# Patient Record
Sex: FEMALE | Race: WHITE | NOT HISPANIC OR LATINO
[De-identification: names, ages, dates, MRNs, and addresses within clinical notes are randomized per-mention and may not be internally consistent; named-entity substitution may affect disease eponyms.]

---

## 2021-05-01 ENCOUNTER — HEALTH MAINTENANCE LETTER (OUTPATIENT)
Age: 21
End: 2021-05-01

## 2021-05-04 ASSESSMENT — ENCOUNTER SYMPTOMS
RECTAL PAIN: 1
PANIC: 0
DEPRESSION: 0
INSOMNIA: 1
BOWEL INCONTINENCE: 0
BLOATING: 1
FLANK PAIN: 0
NAUSEA: 1
DIFFICULTY URINATING: 0
NERVOUS/ANXIOUS: 1
DIARRHEA: 0
BLOOD IN STOOL: 0
JAUNDICE: 0
HOT FLASHES: 0
CONSTIPATION: 1
DECREASED CONCENTRATION: 1
DECREASED LIBIDO: 0
HEMATURIA: 0
ABDOMINAL PAIN: 1
VOMITING: 0
HEARTBURN: 0
DYSURIA: 1

## 2021-05-04 ASSESSMENT — ANXIETY QUESTIONNAIRES
7. FEELING AFRAID AS IF SOMETHING AWFUL MIGHT HAPPEN: NOT AT ALL
2. NOT BEING ABLE TO STOP OR CONTROL WORRYING: SEVERAL DAYS
3. WORRYING TOO MUCH ABOUT DIFFERENT THINGS: SEVERAL DAYS
4. TROUBLE RELAXING: SEVERAL DAYS
6. BECOMING EASILY ANNOYED OR IRRITABLE: SEVERAL DAYS
GAD7 TOTAL SCORE: 5
1. FEELING NERVOUS, ANXIOUS, OR ON EDGE: SEVERAL DAYS
5. BEING SO RESTLESS THAT IT IS HARD TO SIT STILL: NOT AT ALL
7. FEELING AFRAID AS IF SOMETHING AWFUL MIGHT HAPPEN: NOT AT ALL
GAD7 TOTAL SCORE: 5

## 2021-05-05 ASSESSMENT — ANXIETY QUESTIONNAIRES: GAD7 TOTAL SCORE: 5

## 2021-05-07 ENCOUNTER — OFFICE VISIT (OUTPATIENT)
Dept: OBGYN | Facility: CLINIC | Age: 21
End: 2021-05-07
Payer: COMMERCIAL

## 2021-05-07 VITALS
DIASTOLIC BLOOD PRESSURE: 83 MMHG | BODY MASS INDEX: 19.25 KG/M2 | SYSTOLIC BLOOD PRESSURE: 129 MMHG | HEART RATE: 128 BPM | HEIGHT: 66 IN | WEIGHT: 119.8 LBS

## 2021-05-07 DIAGNOSIS — Z12.4 SCREENING FOR MALIGNANT NEOPLASM OF CERVIX: ICD-10-CM

## 2021-05-07 DIAGNOSIS — Z00.00 VISIT FOR PREVENTIVE HEALTH EXAMINATION: Primary | ICD-10-CM

## 2021-05-07 DIAGNOSIS — Z01.411 ENCOUNTER FOR GYNECOLOGICAL EXAMINATION WITH ABNORMAL FINDING: ICD-10-CM

## 2021-05-07 DIAGNOSIS — B37.31 YEAST VAGINITIS: ICD-10-CM

## 2021-05-07 DIAGNOSIS — Z30.011 ORAL CONTRACEPTION INITIAL PRESCRIPTION: ICD-10-CM

## 2021-05-07 LAB
CLUE CELLS: NEGATIVE
TRICHOMONAS (WET PREP): NEGATIVE
YEAST (WET PREP): POSITIVE

## 2021-05-07 PROCEDURE — G0145 SCR C/V CYTO,THINLAYER,RESCR: HCPCS | Performed by: OBSTETRICS & GYNECOLOGY

## 2021-05-07 PROCEDURE — 99385 PREV VISIT NEW AGE 18-39: CPT | Performed by: OBSTETRICS & GYNECOLOGY

## 2021-05-07 PROCEDURE — 99212 OFFICE O/P EST SF 10 MIN: CPT | Mod: 25 | Performed by: OBSTETRICS & GYNECOLOGY

## 2021-05-07 PROCEDURE — G0463 HOSPITAL OUTPT CLINIC VISIT: HCPCS

## 2021-05-07 PROCEDURE — 87210 SMEAR WET MOUNT SALINE/INK: CPT | Performed by: OBSTETRICS & GYNECOLOGY

## 2021-05-07 RX ORDER — FLUCONAZOLE 150 MG/1
150 TABLET ORAL DAILY
Qty: 3 TABLET | Refills: 0 | Status: SHIPPED | OUTPATIENT
Start: 2021-05-07 | End: 2021-05-10

## 2021-05-07 RX ORDER — LEVONORGESTREL/ETHIN.ESTRADIOL 0.1-0.02MG
1 TABLET ORAL DAILY
Qty: 84 TABLET | Refills: 4 | Status: SHIPPED | OUTPATIENT
Start: 2021-05-07 | End: 2022-06-07

## 2021-05-07 ASSESSMENT — ANXIETY QUESTIONNAIRES
2. NOT BEING ABLE TO STOP OR CONTROL WORRYING: SEVERAL DAYS
GAD7 TOTAL SCORE: 5
3. WORRYING TOO MUCH ABOUT DIFFERENT THINGS: SEVERAL DAYS
5. BEING SO RESTLESS THAT IT IS HARD TO SIT STILL: NOT AT ALL
1. FEELING NERVOUS, ANXIOUS, OR ON EDGE: SEVERAL DAYS
6. BECOMING EASILY ANNOYED OR IRRITABLE: SEVERAL DAYS
7. FEELING AFRAID AS IF SOMETHING AWFUL MIGHT HAPPEN: NOT AT ALL

## 2021-05-07 ASSESSMENT — PATIENT HEALTH QUESTIONNAIRE - PHQ9: 5. POOR APPETITE OR OVEREATING: SEVERAL DAYS

## 2021-05-07 ASSESSMENT — MIFFLIN-ST. JEOR: SCORE: 1315.16

## 2021-05-07 NOTE — PROGRESS NOTES
"SUBJECTIVE   Valerie Rachel is a 21 year old G0, No LMP recorded., here for an annual preventive exam.    Specific concerns today:  Establish care (previously lived in [facility])    1. Chronic bladder pain x 1-2 years, no food triggers, tried pelvic PT (helpful, not constant), worse with really stress and dehydration, more manageable now  2. \"somethings going on\", very uncomfortable x 2 days, kierra yestesterday.  Has had chronic vulvovaginitis 5-6 years, since college (yeast), doesn't test as yeast recently, more common right before period or right after, sx = itching/thick discharge, general discomfort, mostly external, after sex sometimes?  Has previously been on weekly long term diflucan (helped for a while, then not so much), vaginal E2 not helpful.  Did boric acid?    Gynecologic History  LMP 2 weeks ago  Menstrual History:  Menstrual History 5/10/2021   LAST MENSTRUAL PERIOD 2021     Current contraception: oral contraceptives, With new pack, HA and nausea, went on for skin and heavy periods  Desires pregnancy within 12 months: N  Denies history of STI  No results found for: PAP   History of abnormal Pap smear:  No, last in 2019  HPV vaccine reports received 3/3    Obstetric History  OB History    Para Term  AB Living   0 0 0 0 0 0   SAB TAB Ectopic Multiple Live Births   0 0 0 0 0        Health Maintenance    There is no immunization history on file for this patient.  Health Maintenance   Topic Date Due     PREVENTIVE CARE VISIT  Never done     CHLAMYDIA SCREENING  Never done     ADVANCE CARE PLANNING  Never done     DTAP/TDAP/TD IMMUNIZATION (1 - Tdap) Never done     HPV IMMUNIZATION (1 - 2-dose series) Never done     HIV SCREENING  Never done     HEPATITIS C SCREENING  Never done     PAP  Never done     COVID-19 Vaccine (2 - Pfizer 2-dose series) 2021     INFLUENZA VACCINE (Season Ended) 2021     PHQ-2  Completed     IPV IMMUNIZATION  Completed     Pneumococcal Vaccine: " Pediatrics (0 to 5 Years) and At-Risk Patients (6 to 64 Years)  Aged Out     MENINGITIS IMMUNIZATION  Aged Out     HEPATITIS B IMMUNIZATION  Aged Out     No results found for: CHOL  No results found for: HDL  No results found for: LDL  No results found for: TSH    Past Medical History  Past Medical History:   Diagnosis Date     Anxiety      Patellar tendonitis      Recurrent yeast vulvovaginitis        Past Surgical History  Past Surgical History:   Procedure Laterality Date     NO HISTORY OF SURGERY         Medications  Current Outpatient Medications   Medication     cholecalciferol 50 MCG (2000 UT) tablet     Norgestimate-Eth Estradiol (ESTARYLLA PO)     No current facility-administered medications for this visit.        Allergies  Allergies   Allergen Reactions     Penicillins Hives and Rash       Social History  Social History     Socioeconomic History     Marital status: Single     Spouse name: Not on file     Number of children: Not on file     Years of education: Not on file     Highest education level: Not on file   Occupational History     Not on file   Social Needs     Financial resource strain: Not on file     Food insecurity     Worry: Not on file     Inability: Not on file     Transportation needs     Medical: Not on file     Non-medical: Not on file   Tobacco Use     Smoking status: Never Smoker     Smokeless tobacco: Never Used   Substance and Sexual Activity     Alcohol use: Yes     Comment: I drink socially on occasion.     Drug use: Never     Sexual activity: Yes     Partners: Male     Birth control/protection: Pull-out method, Condom, Pill   Lifestyle     Physical activity     Days per week: Not on file     Minutes per session: Not on file     Stress: Not on file   Relationships     Social connections     Talks on phone: Not on file     Gets together: Not on file     Attends Buddhism service: Not on file     Active member of club or organization: Not on file     Attends meetings of clubs or  "organizations: Not on file     Relationship status: Not on file     Intimate partner violence     Fear of current or ex partner: Not on file     Emotionally abused: Not on file     Physically abused: Not on file     Forced sexual activity: Not on file   Other Topics Concern     Not on file   Social History Narrative    Work:  for [employer]    ANGELA Gonzalez grad: Advertising    Grew up in [place]    Partner lives in [place]       Family History  No family history of breast, uterine, ovarian or colon cancer.    Review of Systems  CONSTITUTIONAL: NEGATIVE for fever, chills  EYES: NEGATIVE for vision changes   RESP: NEGATIVE for significant cough or SOB  CV: NEGATIVE for chest pain, palpitations   GI: NEGATIVE for nausea, abdominal pain, heartburn, or change in bowel habits  : NEGATIVE for frequency, dysuria, or hematuria  MUSCULOSKELETAL: NEGATIVE for significant arthralgias or myalgia  NEURO: NEGATIVE for weakness, dizziness or paresthesias or headache    OBJECTIVE   /83 (BP Location: Right arm, Patient Position: Chair)   Pulse 128   Ht 1.676 m (5' 6\")   Wt 54.3 kg (119 lb 12.8 oz)   BMI 19.34 kg/m      General:  Alert, no distress   HEENT:  Normocephalic, without obvious abnormality  No thyromegaly   Breasts: Within normal limits bilaterally, no LAD  No masses/lumps/tenderness or nipple discharge   Lungs:  Clear to auscultation bilaterally   Heart:  Regular rate and rhythm, no murmur   Abdomen:  Soft, non-tender, non-distended, bowel sounds normal   Pelvic: -normal external genitalia, no visible lesions  -normal urethral meatus  -vagina normal with moderately large amount white, viscous discharge throughout, including vestibule  -cervix normal in appearance, no masses  -uterus small, AV, mobile, NT  -no adnexal masses, NT  -normal appearing anus, perineum   Extremities:  normal     Wet prep + hyphae    LEXUS-7 SCORE 5/4/2021 5/7/2021 5/10/2021   Total Score 5 (mild anxiety) - - "   Total Score 5 5 5     PHQ 5/10/2021   PHQ-9 Total Score 4   Q9: Thoughts of better off dead/self-harm past 2 weeks Not at all     ASSESSMENT   Valerie Rachel is a 21 year old G0, annual preventive exam within normal limits.    PLAN     Age 19-39 Annual Preventive Exam  1.  Screening   STI testing declined   Cervical cancer pap today   CBE wnl    2.  Immunizations: reports getting vaccinations as a child    3.  Patient Education   a.  Additional teaching done at this visit included: birth control (Rx lower EE pill given sx at start of new pack)   b.  Discussed with patient risks/ benefits and treatment options of prescribed medications or other treatment modalities.   c.  Recommended folate 0.4 - 0.8 mg daily for women of reproductive age    4. Yeast vaginitis   Rx diflucan daily x 3 days given history   RTC for yeast culture, possible alternative therapy    5. Mental health: LEXUS improved from previous (outside), feels many time situational, offered [facility] psychology    RTC one week if no sx improvement, o/w annually/prn    Abi Alicea MD MPH

## 2021-05-07 NOTE — PATIENT INSTRUCTIONS

## 2021-05-10 ENCOUNTER — TELEPHONE (OUTPATIENT)
Dept: OBGYN | Facility: CLINIC | Age: 21
End: 2021-05-10

## 2021-05-10 ENCOUNTER — OFFICE VISIT (OUTPATIENT)
Dept: OBGYN | Facility: CLINIC | Age: 21
End: 2021-05-10
Attending: ADVANCED PRACTICE MIDWIFE
Payer: COMMERCIAL

## 2021-05-10 VITALS
WEIGHT: 121.5 LBS | SYSTOLIC BLOOD PRESSURE: 120 MMHG | HEART RATE: 93 BPM | DIASTOLIC BLOOD PRESSURE: 86 MMHG | BODY MASS INDEX: 19.53 KG/M2 | HEIGHT: 66 IN

## 2021-05-10 DIAGNOSIS — B37.31 YEAST VAGINITIS: Primary | ICD-10-CM

## 2021-05-10 PROBLEM — R39.82 CHRONIC BLADDER PAIN: Status: ACTIVE | Noted: 2019-09-12

## 2021-05-10 PROBLEM — M76.50 PATELLAR TENDONITIS: Status: ACTIVE | Noted: 2021-05-10

## 2021-05-10 PROCEDURE — 87102 FUNGUS ISOLATION CULTURE: CPT | Performed by: ADVANCED PRACTICE MIDWIFE

## 2021-05-10 PROCEDURE — G0463 HOSPITAL OUTPT CLINIC VISIT: HCPCS

## 2021-05-10 PROCEDURE — 87106 FUNGI IDENTIFICATION YEAST: CPT | Performed by: ADVANCED PRACTICE MIDWIFE

## 2021-05-10 PROCEDURE — 99213 OFFICE O/P EST LOW 20 MIN: CPT | Performed by: ADVANCED PRACTICE MIDWIFE

## 2021-05-10 ASSESSMENT — PATIENT HEALTH QUESTIONNAIRE - PHQ9
SUM OF ALL RESPONSES TO PHQ QUESTIONS 1-9: 4
5. POOR APPETITE OR OVEREATING: SEVERAL DAYS

## 2021-05-10 ASSESSMENT — ANXIETY QUESTIONNAIRES
5. BEING SO RESTLESS THAT IT IS HARD TO SIT STILL: NOT AT ALL
6. BECOMING EASILY ANNOYED OR IRRITABLE: SEVERAL DAYS
1. FEELING NERVOUS, ANXIOUS, OR ON EDGE: SEVERAL DAYS
2. NOT BEING ABLE TO STOP OR CONTROL WORRYING: SEVERAL DAYS
7. FEELING AFRAID AS IF SOMETHING AWFUL MIGHT HAPPEN: NOT AT ALL
GAD7 TOTAL SCORE: 5
3. WORRYING TOO MUCH ABOUT DIFFERENT THINGS: SEVERAL DAYS

## 2021-05-10 ASSESSMENT — MIFFLIN-ST. JEOR: SCORE: 1322.87

## 2021-05-10 NOTE — PROGRESS NOTES
"Virginie Hampton is a 21 year old who presents for the following health issues:    HPI   Has a history of multiple yeast infections/year  Was seen Friday by Dr. Alicea and given 3 days of Diflucan 150mg daily. Took last dose yesterday and has not noticed any resolution of symptoms; Thick, white, clumpy discharge, itching.  Does state that labia are less edematous, some slight improvement of symptoms.  Sexually active, not this weekend    Review of Systems   Endorses vaginal itching, discharge, general discomfort.  Denies nausea, vomiting, fever, chills, vaginal bleeding       Objective    /86 (BP Location: Left arm, Patient Position: Chair)   Pulse 93   Ht 1.676 m (5' 6\")   Wt 55.1 kg (121 lb 8 oz)   LMP 04/21/2021 (Approximate)   Breastfeeding No   BMI 19.61 kg/m    Body mass index is 19.61 kg/m .  Physical Exam   General: Alert and oriented, in no acute distress.  Skin: Warm and dry, no abnormalities noted.  Eyes: Extra-ocular muscles intact, pupils equal and reactive.  ENT: Speech intact, nasal passages open, no hearing impairment noted.  CV: No cyanosis or pallor, warm and well perfused.  Respiratory: No respiratory distress, no accessory muscle use.  Neuro: Gait and station normal, comprehension intact. Gross and fine motor skills intact.   Psychiatric: Mood and affect appear normal.   Extremities: Warm, able to move all four extremities at will.   (female):  female external genitalia red and irritated, normal urethral meatus and vaginal discharge - moderate, white, thick and odorless    Office Visit on 05/07/2021   Component Date Value Ref Range Status     Trichomonas Wet Prep 05/07/2021 Negative  Negative Final     Clue cells 05/07/2021 Negative  Negative Final     Yeast Wet Prep 05/07/2021 Positive  Negative Final         Assessment & Plan     Yeast vaginitis  Patient has a history of chronic yeast infections, with multiple per year. Last positive wet prep 5/7/21. Reports that typically " resolved with Diflucan but is still having itching, thick discharge. Recommended use of Vagisil for itching.   Yeast culture obtained today and recommend that she follow up with OB at the end of the week. No additional Fluconazole prescription indicated at this time.  - Yeast Culture    I, Elif Nova DO, am serving as a scribe; to document services personally performed by Emily Matias based on data collection and the provider's statements to me.     I agree with the PFSH and ROS as completed by Elif Nova DO  except for changes made by me. The remainder of the encounter was performed by me and scribed by Elif Nova DO. The scribed note accurately reflects my personal services and decisions made by me.   BEN Theodore CNM

## 2021-05-10 NOTE — TELEPHONE ENCOUNTER
----- Message from Luisana Yanes RN sent at 5/10/2021 11:14 AM CDT -----  Regarding: Still having yeast infection symptoms after Diflucan

## 2021-05-10 NOTE — LETTER
"5/10/2021       RE: Valerie Rachel  [address] [address]     Dear Colleague,    Thank you for referring your patient, Valerie Rachel, to the [facility] [place] at [facility]. Please see a copy of my visit note below.    Virginie Hampton is a 21 year old who presents for the following health issues:    HPI   Has a history of multiple yeast infections/year  Was seen Friday by Dr. Alicea and given 3 days of Diflucan 150mg daily. Took last dose yesterday and has not noticed any resolution of symptoms; Thick, white, clumpy discharge, itching.  Does state that labia are less edematous, some slight improvement of symptoms.  Sexually active, not this weekend    Review of Systems   Endorses vaginal itching, discharge, general discomfort.  Denies nausea, vomiting, fever, chills, vaginal bleeding       Objective    /86 (BP Location: Left arm, Patient Position: Chair)   Pulse 93   Ht 1.676 m (5' 6\")   Wt 55.1 kg (121 lb 8 oz)   LMP 04/21/2021 (Approximate)   Breastfeeding No   BMI 19.61 kg/m    Body mass index is 19.61 kg/m .  Physical Exam   General: Alert and oriented, in no acute distress.  Skin: Warm and dry, no abnormalities noted.  Eyes: Extra-ocular muscles intact, pupils equal and reactive.  ENT: Speech intact, nasal passages open, no hearing impairment noted.  CV: No cyanosis or pallor, warm and well perfused.  Respiratory: No respiratory distress, no accessory muscle use.  Neuro: Gait and station normal, comprehension intact. Gross and fine motor skills intact.   Psychiatric: Mood and affect appear normal.   Extremities: Warm, able to move all four extremities at will.   (female):  female external genitalia red and irritated, normal urethral meatus and vaginal discharge - moderate, white, thick and odorless    Office Visit on 05/07/2021   Component Date Value Ref Range Status     Trichomonas Wet Prep 05/07/2021 " Negative  Negative Final     Clue cells 05/07/2021 Negative  Negative Final     Yeast Wet Prep 05/07/2021 Positive  Negative Final         Assessment & Plan     Yeast vaginitis  Patient has a history of chronic yeast infections, with multiple per year. Last positive wet prep 5/7/21. Reports that typically resolved with Diflucan but is still having itching, thick discharge. Recommended use of Vagisil for itching.   Yeast culture obtained today and recommend that she follow up with OB at the end of the week. No additional Fluconazole prescription indicated at this time.  - Yeast Culture    I, Elif Nova DO, am serving as a scribe; to document services personally performed by Emily Matias based on data collection and the provider's statements to me.     I agree with the PFSH and ROS as completed by Elif Nova DO  except for changes made by me. The remainder of the encounter was performed by me and scribed by Elif Nova DO. The scribed note accurately reflects my personal services and decisions made by me.   BEN Theodore CNM          Again, thank you for allowing me to participate in the care of your patient.      Sincerely,    BEN Theodore CNM

## 2021-05-10 NOTE — TELEPHONE ENCOUNTER
Patient called RN with continued symptoms after taking diflucan. Spoke with , wants patient to come in for a yeast culture. Patient agreeable to plan and available, appointment made today at 2:20pm. Nicole Gee RN on 5/10/2021 at 1:47 PM

## 2021-05-10 NOTE — LETTER
Date:May 20, 2021      Patient was self referred, no letter generated. Do not send.        [facility] Health Information

## 2021-05-10 NOTE — NURSING NOTE
Chief Complaint   Patient presents with     Gyn Exam     Treated with Diflucan for yeast last week and still having symptoms. Needs culture.        See AL James 5/10/2021

## 2021-05-11 LAB
COPATH REPORT: NORMAL
PAP: NORMAL

## 2021-05-11 ASSESSMENT — ANXIETY QUESTIONNAIRES: GAD7 TOTAL SCORE: 5

## 2021-05-13 LAB
Lab: ABNORMAL
SPECIMEN SOURCE: ABNORMAL
YEAST SPEC QL CULT: ABNORMAL

## 2021-05-14 ENCOUNTER — OFFICE VISIT (OUTPATIENT)
Dept: OBGYN | Facility: CLINIC | Age: 21
End: 2021-05-14
Attending: OBSTETRICS & GYNECOLOGY
Payer: COMMERCIAL

## 2021-05-14 VITALS — HEIGHT: 66 IN | WEIGHT: 121.4 LBS | BODY MASS INDEX: 19.51 KG/M2

## 2021-05-14 DIAGNOSIS — B37.31 YEAST VAGINITIS: Primary | ICD-10-CM

## 2021-05-14 PROCEDURE — G0463 HOSPITAL OUTPT CLINIC VISIT: HCPCS

## 2021-05-14 PROCEDURE — 99213 OFFICE O/P EST LOW 20 MIN: CPT | Performed by: OBSTETRICS & GYNECOLOGY

## 2021-05-14 RX ORDER — TERCONAZOLE 0.4 %
1 CREAM WITH APPLICATOR VAGINAL AT BEDTIME
Qty: 1 G | Refills: 0 | Status: SHIPPED | OUTPATIENT
Start: 2021-05-14 | End: 2021-05-28

## 2021-05-14 ASSESSMENT — MIFFLIN-ST. JEOR: SCORE: 1322.42

## 2021-05-14 NOTE — LETTER
Date:May 20, 2021      Patient was self referred, no letter generated. Do not send.        [facility] Health Information

## 2021-05-14 NOTE — PROGRESS NOTES
"SUBJECTIVE   Valerie Rachel is a 21 year old G0, Patient's last menstrual period was 04/21/2021 (approximate)., here for yeast vulvovaginitis follow up.    Took diflucan daily x 3 days starting one week ago, labial/introital swelling improved, itching improved although not gone and thus was seen 4 days ago for culture - candida albicans.  Today, notes feeling much better, slight itch.  Worried maybe isn't \"gone\".  Due for withdrawal bleeding in 2-3 days, usually improves after that she notes today.    Previously has used boric acid prn for sx (none for this episode).    Past Medical History  Past Medical History:   Diagnosis Date     Anxiety      Patellar tendonitis      Recurrent yeast vulvovaginitis        Medications  Current Outpatient Medications   Medication     terconazole (TERAZOL 7) 0.4 % vaginal cream     cholecalciferol 50 MCG (2000 UT) tablet     levonorgestrel-ethinyl estradiol (AVIANE) 0.1-20 MG-MCG tablet     Norgestimate-Eth Estradiol (ESTARYLLA PO)     No current facility-administered medications for this visit.        Allergies  Allergies   Allergen Reactions     Penicillins Hives and Rash       Review of Systems   ROS: 10 point ROS neg other than the symptoms noted above in the HPI.    OBJECTIVE   Ht 1.676 m (5' 6\")   Wt 55.1 kg (121 lb 6.4 oz)   LMP 04/21/2021 (Approximate)   BMI 19.59 kg/m    General:  Alert, no distress   Head:  Normocephalic, without obvious abnormality   Pelvic: deferred   Extremities:  normal     ASSESSMENT   Valerie Rachel is a 21 year old G0, candida vaginitis.    PLAN     -- given history, recommend vaginal azole therapy x 14 weeks to ensure resolution of infection  -- f/u 2 weeks     20 minutes spent on the date of the encounter doing chart review (previous clinic visits, hospital records, lab results, imaging studies, and procedural documentation) and the patient's history and exam, documentation and further activities as noted above.    Abi Alicea MD MPH    "
none

## 2021-05-14 NOTE — LETTER
"5/14/2021       RE: Valerie Rachel  [address] [address]     Dear Colleague,    Thank you for referring your patient, Valerie Rachel, to the [facility] [place] at [facility]. Please see a copy of my visit note below.    SUBJECTIVE   Valerie Rachel is a 21 year old G0, Patient's last menstrual period was 04/21/2021 (approximate)., here for yeast vulvovaginitis follow up.    Took diflucan daily x 3 days starting one week ago, labial/introital swelling improved, itching improved although not gone and thus was seen 4 days ago for culture - candida albicans.  Today, notes feeling much better, slight itch.  Worried maybe isn't \"gone\".  Due for withdrawal bleeding in 2-3 days, usually improves after that she notes today.    Previously has used boric acid prn for sx (none for this episode).    Past Medical History  Past Medical History:   Diagnosis Date     Anxiety      Patellar tendonitis      Recurrent yeast vulvovaginitis        Medications  Current Outpatient Medications   Medication     terconazole (TERAZOL 7) 0.4 % vaginal cream     cholecalciferol 50 MCG (2000 UT) tablet     levonorgestrel-ethinyl estradiol (AVIANE) 0.1-20 MG-MCG tablet     Norgestimate-Eth Estradiol (ESTARYLLA PO)     No current facility-administered medications for this visit.        Allergies  Allergies   Allergen Reactions     Penicillins Hives and Rash       Review of Systems   ROS: 10 point ROS neg other than the symptoms noted above in the HPI.    OBJECTIVE   Ht 1.676 m (5' 6\")   Wt 55.1 kg (121 lb 6.4 oz)   LMP 04/21/2021 (Approximate)   BMI 19.59 kg/m    General:  Alert, no distress   Head:  Normocephalic, without obvious abnormality   Pelvic: deferred   Extremities:  normal     ASSESSMENT   Valerie Rachel is a 21 year old G0, candida vaginitis.    PLAN     -- given history, recommend vaginal azole therapy x 14 weeks to ensure resolution of " infection  -- f/u 2 weeks     20 minutes spent on the date of the encounter doing chart review (previous clinic visits, hospital records, lab results, imaging studies, and procedural documentation) and the patient's history and exam, documentation and further activities as noted above.    Abi Alicea MD MPH        Again, thank you for allowing me to participate in the care of your patient.      Sincerely,    Dez Alicea MD

## 2021-05-20 ENCOUNTER — MYC MEDICAL ADVICE (OUTPATIENT)
Dept: OBGYN | Facility: CLINIC | Age: 21
End: 2021-05-20

## 2021-05-20 DIAGNOSIS — B37.31 YEAST VAGINITIS: ICD-10-CM

## 2021-05-21 NOTE — TELEPHONE ENCOUNTER
MyChart message received from patient regarding terconazole rx.    Note states she is to take for 14 days, ordered terconazole 7 for 14 days. Called pharmacy to confirm whether or not she will have enough for a 14 day dose. Pharmacist states she would only have enough supply for 7-10 days. Verbal order for refill given, pharmacist confirmed.     MyChart message sent to patient informing her that a refill was sent.          The sheath is inserted into the right femoral vein.

## 2021-05-28 ENCOUNTER — ALLIED HEALTH/NURSE VISIT (OUTPATIENT)
Dept: OBGYN | Facility: CLINIC | Age: 21
End: 2021-05-28
Attending: OBSTETRICS & GYNECOLOGY
Payer: COMMERCIAL

## 2021-05-28 VITALS
HEART RATE: 80 BPM | HEIGHT: 66 IN | BODY MASS INDEX: 20.15 KG/M2 | DIASTOLIC BLOOD PRESSURE: 78 MMHG | WEIGHT: 125.4 LBS | SYSTOLIC BLOOD PRESSURE: 117 MMHG

## 2021-05-28 DIAGNOSIS — B37.31 YEAST VAGINITIS: Primary | ICD-10-CM

## 2021-05-28 PROCEDURE — G0463 HOSPITAL OUTPT CLINIC VISIT: HCPCS

## 2021-05-28 PROCEDURE — 99213 OFFICE O/P EST LOW 20 MIN: CPT | Performed by: OBSTETRICS & GYNECOLOGY

## 2021-05-28 ASSESSMENT — MIFFLIN-ST. JEOR: SCORE: 1340.56

## 2021-05-28 ASSESSMENT — PAIN SCALES - GENERAL: PAINLEVEL: NO PAIN (0)

## 2021-05-28 NOTE — PROGRESS NOTES
"SUBJECTIVE   Valerie Rachel is a 21 year old G0, Patient's last menstrual period was 03/17/2021., here for yeast f/u    Sx free from yeast, one day left of intravaginal therapy  Wondering about future therapy if recurrs    Bloaty, rocha x 6d during what what would have been placebo week, feels fine now.  Wondering if related to new pill or if related to continuous use.  No bleeding    Past Medical History  Past Medical History:   Diagnosis Date     Anxiety      Patellar tendonitis      Recurrent yeast vulvovaginitis        Medications  Current Outpatient Medications   Medication     cholecalciferol 50 MCG (2000 UT) tablet     terconazole (TERAZOL 7) 0.4 % vaginal cream     levonorgestrel-ethinyl estradiol (AVIANE) 0.1-20 MG-MCG tablet     No current facility-administered medications for this visit.      Allergies  Allergies   Allergen Reactions     Penicillins Hives and Rash     Review of Systems   ROS: 10 point ROS neg other than the symptoms noted above in the HPI.    OBJECTIVE   /78   Pulse 80   Ht 1.676 m (5' 6\")   Wt 56.9 kg (125 lb 6.4 oz)   LMP 03/17/2021   BMI 20.24 kg/m    General:  Alert, no distress   Head:  Normocephalic, without obvious abnormality   Extremities:  normal       ASSESSMENT   Valerie Rachel is a 21 year old G0, yeast follow up.    PLAN     -- Yeast vaginitis: plan see with sx, if another wet prep or culture+ will consider preventive therapy  -- Reviewed [facility] mgmt, plan another pack of continuous use for now    15 minutes spent on the date of the encounter doing chart review (previous clinic visits, hospital records, lab results, imaging studies, and procedural documentation) and the patient's history and exam, documentation and further activities as noted above.    Abi Alicea MD MPH      "

## 2021-07-28 ENCOUNTER — MYC MEDICAL ADVICE (OUTPATIENT)
Dept: OBGYN | Facility: CLINIC | Age: 21
End: 2021-07-28

## 2021-07-28 NOTE — TELEPHONE ENCOUNTER
MyChart message received from patient reporting symptoms of yeast infection. Pt has history of difficult to treat vaginal yeast infections. Last infection 05/2021, 14 day terconazole treatment completed.     Called patient and left VM to call back to schedule appointment. Appointment held 7/29 at 2:30. Advised nothing in vagina and to stop boric acid 24 hours prior to appointment for accurate yeast culture. Instructed patient to call back to confirm availability for appointment or to reply to [employer] message which was sent as well.

## 2021-07-29 ENCOUNTER — OFFICE VISIT (OUTPATIENT)
Dept: OBGYN | Facility: CLINIC | Age: 21
End: 2021-07-29
Attending: OBSTETRICS & GYNECOLOGY
Payer: COMMERCIAL

## 2021-07-29 VITALS
SYSTOLIC BLOOD PRESSURE: 126 MMHG | BODY MASS INDEX: 20.59 KG/M2 | HEIGHT: 66 IN | WEIGHT: 128.1 LBS | HEART RATE: 57 BPM | DIASTOLIC BLOOD PRESSURE: 82 MMHG

## 2021-07-29 DIAGNOSIS — B37.31 RECURRENT CANDIDIASIS OF VAGINA: Primary | ICD-10-CM

## 2021-07-29 LAB
CLUE CELLS: NEGATIVE
TRICHOMONAS (WET PREP): NEGATIVE
WBC (WET PREP): NEGATIVE
YEAST (WET PREP): POSITIVE

## 2021-07-29 PROCEDURE — G0463 HOSPITAL OUTPT CLINIC VISIT: HCPCS

## 2021-07-29 PROCEDURE — 99213 OFFICE O/P EST LOW 20 MIN: CPT | Mod: GE | Performed by: OBSTETRICS & GYNECOLOGY

## 2021-07-29 PROCEDURE — 87210 SMEAR WET MOUNT SALINE/INK: CPT | Performed by: STUDENT IN AN ORGANIZED HEALTH CARE EDUCATION/TRAINING PROGRAM

## 2021-07-29 RX ORDER — FLUCONAZOLE 200 MG/1
200 TABLET ORAL WEEKLY
Qty: 28 TABLET | Refills: 0 | Status: SHIPPED | OUTPATIENT
Start: 2021-07-29 | End: 2022-06-07

## 2021-07-29 ASSESSMENT — ANXIETY QUESTIONNAIRES
5. BEING SO RESTLESS THAT IT IS HARD TO SIT STILL: NOT AT ALL
6. BECOMING EASILY ANNOYED OR IRRITABLE: SEVERAL DAYS
2. NOT BEING ABLE TO STOP OR CONTROL WORRYING: SEVERAL DAYS
7. FEELING AFRAID AS IF SOMETHING AWFUL MIGHT HAPPEN: NOT AT ALL
GAD7 TOTAL SCORE: 4
1. FEELING NERVOUS, ANXIOUS, OR ON EDGE: SEVERAL DAYS
3. WORRYING TOO MUCH ABOUT DIFFERENT THINGS: SEVERAL DAYS

## 2021-07-29 ASSESSMENT — MIFFLIN-ST. JEOR: SCORE: 1352.81

## 2021-07-29 ASSESSMENT — PATIENT HEALTH QUESTIONNAIRE - PHQ9: 5. POOR APPETITE OR OVEREATING: NOT AT ALL

## 2021-07-29 NOTE — PROGRESS NOTES
"[facility]  2021    Reason For Visit: yeast infection    S: Valerie Rachel is a 21 year old  with a history of recurrent yeast infection here for concern for yeast infection.     She reports taht about 5-6 years ago she had recurrent yeast infectins and was on long term diflucan, which helped for a while. She was seen on  for a yeast infection, and was given diflucan daily x3 days, followed by terconazole x14 days. She has been doing well since then. Now over the past few days has had increased white discharge and itchiness, consistent with her yeast infections. Denies urinary symptoms. Denies immunosuppression, diabetes, or any other medical conditions. Denies fevers, chills, headaches, chest pain, SOB, abdominal pain, n/v, constipation, diarrhea, difficulties with urination.     OBGYN Hx  Age at menarche:   Length of menstrual cycle: monthly  Heavy bleeding: no  Pain with menses: no  Pap Smears: 2021: NIL  Contraception: pills    O:   /82 (BP Location: Left arm, Patient Position: Chair)   Pulse 57   Ht 1.676 m (5' 6\")   Wt 58.1 kg (128 lb 1.6 oz)   BMI 20.68 kg/m    Exam:  Gen: alert, no acute distress  CV: Well perfused, regular rate  Resp: On room air, no increased work of breathing  : normal vulva and vagina. Increased thick white discharge present. Normal appearing cervix without lesions or active bleeding.     Wet prep:  Positive for yeast. No trichomonas or clue cells    A/P: 21 year old year old  with a history of recurrent yeast infections, here for a yeast infection.     # recurrent vaginal candidiasis  She has no diabetes or immunocompromising conditions that would increase her risk. Today, has thick white discharge on exam + yeast on wet prep, consistent with current yeast infection. Discussed treatment of this infection +/- long term maintenance treatment. She desires longer term treatment at this time due to having two infections within the past few months.   - " diflucan 200mg q72h x 3 doses  - followed by: diflucan 200mg weekly x6 months    Return to clinic as needed    Thompson Parker MD  OB/GYN PGY-2  07/29/2021 4:55 PM     The Patient was seen in [facility] by THOMPSON PARKER.  I reviewed the history & exam. Assessment and plan were jointly made.    Justyna Easley MD

## 2021-07-29 NOTE — LETTER
"2021       RE: Valerie Rachel  [address] [address]     Dear Colleague,    Thank you for referring your patient, Valerie Rachel, to the [facility] [place] at [facility]. Please see a copy of my visit note below.    [facility]  2021    Reason For Visit: yeast infection    S: Valerie Rachel is a 21 year old  with a history of recurrent yeast infection here for concern for yeast infection.     She reports taht about 5-6 years ago she had recurrent yeast infectins and was on long term diflucan, which helped for a while. She was seen on  for a yeast infection, and was given diflucan daily x3 days, followed by terconazole x14 days. She has been doing well since then. Now over the past few days has had increased white discharge and itchiness, consistent with her yeast infections. Denies urinary symptoms. Denies immunosuppression, diabetes, or any other medical conditions. Denies fevers, chills, headaches, chest pain, SOB, abdominal pain, n/v, constipation, diarrhea, difficulties with urination.     OBGYN Hx  Age at menarche:   Length of menstrual cycle: monthly  Heavy bleeding: no  Pain with menses: no  Pap Smears: 2021: NIL  Contraception: pills    O:   /82 (BP Location: Left arm, Patient Position: Chair)   Pulse 57   Ht 1.676 m (5' 6\")   Wt 58.1 kg (128 lb 1.6 oz)   BMI 20.68 kg/m    Exam:  Gen: alert, no acute distress  CV: Well perfused, regular rate  Resp: On room air, no increased work of breathing  : normal vulva and vagina. Increased thick white discharge present. Normal appearing cervix without lesions or active bleeding.     Wet prep:  Positive for yeast. No trichomonas or clue cells    A/P: 21 year old year old  with a history of recurrent yeast infections, here for a yeast infection.     # recurrent vaginal candidiasis  She has no diabetes or immunocompromising conditions that " would increase her risk. Today, has thick white discharge on exam + yeast on wet prep, consistent with current yeast infection. Discussed treatment of this infection +/- long term maintenance treatment. She desires longer term treatment at this time due to having two infections within the past few months.   - diflucan 200mg q72h x 3 doses  - followed by: diflucan 200mg weekly x6 months    Return to clinic as needed    Thompson Parker MD  OB/GYN PGY-2  07/29/2021 4:55 PM     The Patient was seen in [facility] by THOMPSON PARKER.  I reviewed the history & exam. Assessment and plan were jointly made.    Justyna Easley MD

## 2021-07-30 ASSESSMENT — ANXIETY QUESTIONNAIRES: GAD7 TOTAL SCORE: 4

## 2021-08-11 ENCOUNTER — OFFICE VISIT (OUTPATIENT)
Dept: OBGYN | Facility: CLINIC | Age: 21
End: 2021-08-11
Attending: STUDENT IN AN ORGANIZED HEALTH CARE EDUCATION/TRAINING PROGRAM
Payer: COMMERCIAL

## 2021-08-11 VITALS
BODY MASS INDEX: 20.79 KG/M2 | DIASTOLIC BLOOD PRESSURE: 89 MMHG | WEIGHT: 128.8 LBS | HEART RATE: 80 BPM | SYSTOLIC BLOOD PRESSURE: 139 MMHG

## 2021-08-11 DIAGNOSIS — N76.0 VAGINITIS AND VULVOVAGINITIS: Primary | ICD-10-CM

## 2021-08-11 PROCEDURE — 99213 OFFICE O/P EST LOW 20 MIN: CPT | Mod: GE | Performed by: OBSTETRICS & GYNECOLOGY

## 2021-08-11 PROCEDURE — G0463 HOSPITAL OUTPT CLINIC VISIT: HCPCS

## 2021-08-11 PROCEDURE — 87102 FUNGUS ISOLATION CULTURE: CPT | Performed by: STUDENT IN AN ORGANIZED HEALTH CARE EDUCATION/TRAINING PROGRAM

## 2021-08-11 ASSESSMENT — ANXIETY QUESTIONNAIRES
6. BECOMING EASILY ANNOYED OR IRRITABLE: SEVERAL DAYS
GAD7 TOTAL SCORE: 4
1. FEELING NERVOUS, ANXIOUS, OR ON EDGE: SEVERAL DAYS
2. NOT BEING ABLE TO STOP OR CONTROL WORRYING: SEVERAL DAYS
5. BEING SO RESTLESS THAT IT IS HARD TO SIT STILL: NOT AT ALL
3. WORRYING TOO MUCH ABOUT DIFFERENT THINGS: SEVERAL DAYS
7. FEELING AFRAID AS IF SOMETHING AWFUL MIGHT HAPPEN: NOT AT ALL

## 2021-08-11 ASSESSMENT — PATIENT HEALTH QUESTIONNAIRE - PHQ9
SUM OF ALL RESPONSES TO PHQ QUESTIONS 1-9: 4
5. POOR APPETITE OR OVEREATING: NOT AT ALL

## 2021-08-11 NOTE — PROGRESS NOTES
[facility]  DOS: 2021  CC: vaginitis symptoms    HPI:    Valerie Rachel is a 21 year old , here for lingering symptoms of fluconazole    Describes symptoms as itching and discomfort. Has gotten better overall since starting fluconazole. Does have withdrawal bleeds without symptoms. Has had yeast issues for 5-6 years now.     Some dysuria. Declines checking for UTI today    Periods: skipped last one with OCPs    Sexual activity: yes with one partner  Contraception use: OCPs, has been on birth control since high school, used to be on triphasic. Monophasic for years    Accompanied by her partner.     OBHx  OB History    Para Term  AB Living   0 0 0 0 0 0   SAB TAB Ectopic Multiple Live Births   0 0 0 0 0       PMHx:   Past Medical History:   Diagnosis Date     Anxiety      Patellar tendonitis      Recurrent yeast vulvovaginitis        PSHx:   Past Surgical History:   Procedure Laterality Date     NO HISTORY OF SURGERY          Meds:   Current Outpatient Medications   Medication Instructions     cholecalciferol 2,000 Units, Oral     fluconazole (DIFLUCAN) 200 mg, Oral, WEEKLY, Take one pill today. Take every 3 days for the next 2 doses. Then take one pill weekly.     levonorgestrel-ethinyl estradiol (AVIANE) 0.1-20 MG-MCG tablet 1 tablet, Oral, DAILY     Allergies:       Allergies   Allergen Reactions     Penicillins Hives and Rash       ROS: 10-Point ROS negative except as noted in HPI      Physical Exam  /89   Pulse 80   Wt 58.4 kg (128 lb 12.8 oz)   BMI 20.79 kg/m      Gen: Well-appearing, NAD  HEENT: Normocephalic, atraumatic  CV:  Regular rate  Pulm: Nonlabored on RA, no increased work of breathing  Pelvic:  Deferred    Labs/Imaging: wet prep positive for yeast  and     Assessment/Plan  Valerie Rachel is a 21 year old  female here for recurrent yeast infections/vaginitis symptoms. She has had at least 2 confirmed yeast infections this year. She is not  immunocompromised. She was recently seen in our clinic and started on fluconazole q72h X 3, with recommendation for weekly maintenance therapy for 6 months. Today she is feeling like her symptoms are still lingering. We will do a yeast culture.  We discussed vulvar practices such as how oral sex and semen can change pH and microbiome, as well as tight clothing, spandex, prolonged sitting in wet swimsuits etc. We also discussed changes from harsher detergents, the importance of not douching or using soap in that area. She will try to incorporate these practices and message me in 2 weeks, while continuing the weekly fluconazole. We can consider vaginal estrogen for her, as well. She also mentioned trying boric acid suppositories before and these helping and she can continue these.    She additionally mentioned chronic bladder pain and I recommended her meeting with Dr. Nance.       [facility]    Follow Up: prn    Patient discussed with Dr. Mcdowell who is in agreement with plan.    Libby Winston MD  Obstetrics and Gynecology PGY-3  8/11/2021    The patient was seen in resident continuity clinic by Dr. Winston.  I have reviewed the history and exam, the assessment and plan were jointly made.     Vibha Mcdowell MD, FACOG

## 2021-08-11 NOTE — NURSING NOTE
Chief Complaint   Patient presents with     Follow Up     Follow up for yeast infection, pt still feels itching and dsicomfort

## 2021-08-11 NOTE — LETTER
2021       RE: Valerie Rachel  [address] [address]     Dear Colleague,    Thank you for referring your patient, Valerie Rachel, to the [facility] [place] at [facility]. Please see a copy of my visit note below.    [facility]  DOS: 2021  CC: vaginitis symptoms    HPI:    Valerie Rachel is a 21 year old , here for lingering symptoms of fluconazole    Describes symptoms as itching and discomfort. Has gotten better overall since starting fluconazole. Does have periods without symptoms. Has had yeast issues for 5-6 years now.     Some dysuria. Declines checking for UTI today    Periods: skipped last one with OCPs    Sexual activity: yes with one partner  Contraception use: OCPs, has been on birth control since high school, used to be on triphasic. Monophasic for years    Accompanied by her partner.     OBHx  OB History    Para Term  AB Living   0 0 0 0 0 0   SAB TAB Ectopic Multiple Live Births   0 0 0 0 0       PMHx:   Past Medical History:   Diagnosis Date     Anxiety      Patellar tendonitis      Recurrent yeast vulvovaginitis        PSHx:   Past Surgical History:   Procedure Laterality Date     NO HISTORY OF SURGERY          Meds:   Current Outpatient Medications   Medication Instructions     cholecalciferol 2,000 Units, Oral     fluconazole (DIFLUCAN) 200 mg, Oral, WEEKLY, Take one pill today. Take every 3 days for the next 2 doses. Then take one pill weekly.     levonorgestrel-ethinyl estradiol (AVIANE) 0.1-20 MG-MCG tablet 1 tablet, Oral, DAILY     Allergies:       Allergies   Allergen Reactions     Penicillins Hives and Rash       ROS: 10-Point ROS negative except as noted in HPI      Physical Exam  /89   Pulse 80   Wt 58.4 kg (128 lb 12.8 oz)   BMI 20.79 kg/m      Gen: Well-appearing, NAD  HEENT: Normocephalic, atraumatic  CV:  Regular rate  Pulm: Nonlabored on RA, no increased  work of breathing  Pelvic:  Deferred    Labs/Imaging: wet prep positive for yeast  and     Assessment/Plan  Valerie Rachel is a 21 year old  female here for recurrent yeast infections/vaginitis symptoms. She has had at least 2 confirmed yeast infections this year. She is not immunocompromised. She was recently seen in our clinic and started on fluconazole q72h X 3, with recommendation for weekly maintenance therapy for 6 months. Today she is feeling like her symptoms are still lingering. We will do a yeast culture.  We discussed vulvar practices such as how oral sex and semen can change pH and microbiome, as well as tight clothing, spandex, prolonged sitting in wet swimsuits etc. We also discussed changes from harsher detergents, the importance of not douching or using soap in that area. She will try to incorporate these practices and message me in 2 weeks, while continuing the weekly fluconazole. We can consider vaginal estrogen for her, as well. She also mentioned trying boric acid suppositories before and these helping and she can continue these.    She additionally mentioned chronic bladder pain and I recommended her meeting with Dr. Nance.       Any results via [facility]    Follow Up: prn    Patient discussed with Dr. Mcdowell who is in agreement with plan.    Libby Winston MD  Obstetrics and Gynecology PGY-3  2021

## 2021-08-12 ASSESSMENT — ANXIETY QUESTIONNAIRES: GAD7 TOTAL SCORE: 4

## 2021-08-16 LAB — BACTERIA VAG AEROBE CULT: NORMAL

## 2021-10-11 ENCOUNTER — HEALTH MAINTENANCE LETTER (OUTPATIENT)
Age: 21
End: 2021-10-11

## 2022-06-07 ENCOUNTER — OFFICE VISIT (OUTPATIENT)
Dept: OBGYN | Facility: CLINIC | Age: 22
End: 2022-06-07
Attending: NURSE PRACTITIONER
Payer: COMMERCIAL

## 2022-06-07 ENCOUNTER — LAB (OUTPATIENT)
Dept: LAB | Facility: CLINIC | Age: 22
End: 2022-06-07
Attending: NURSE PRACTITIONER
Payer: COMMERCIAL

## 2022-06-07 VITALS — HEART RATE: 65 BPM | DIASTOLIC BLOOD PRESSURE: 78 MMHG | SYSTOLIC BLOOD PRESSURE: 116 MMHG

## 2022-06-07 DIAGNOSIS — Z00.00 VISIT FOR PREVENTIVE HEALTH EXAMINATION: Primary | ICD-10-CM

## 2022-06-07 DIAGNOSIS — B37.31 RECURRENT CANDIDIASIS OF VAGINA: ICD-10-CM

## 2022-06-07 DIAGNOSIS — Z30.011 ORAL CONTRACEPTION INITIAL PRESCRIPTION: ICD-10-CM

## 2022-06-07 DIAGNOSIS — Z13.220 SCREENING FOR LIPID DISORDERS: ICD-10-CM

## 2022-06-07 DIAGNOSIS — N89.8 VAGINAL ITCHING: ICD-10-CM

## 2022-06-07 DIAGNOSIS — Z00.00 VISIT FOR PREVENTIVE HEALTH EXAMINATION: ICD-10-CM

## 2022-06-07 DIAGNOSIS — Z13.1 SCREENING FOR DIABETES MELLITUS: ICD-10-CM

## 2022-06-07 DIAGNOSIS — Z11.3 SCREEN FOR STD (SEXUALLY TRANSMITTED DISEASE): ICD-10-CM

## 2022-06-07 LAB
CHOLEST SERPL-MCNC: 219 MG/DL
CLUE CELLS: NEGATIVE
FASTING STATUS PATIENT QL REPORTED: NO
HBA1C MFR BLD: 5.2 % (ref 0–5.6)
HDLC SERPL-MCNC: 67 MG/DL
LDLC SERPL CALC-MCNC: 130 MG/DL
NONHDLC SERPL-MCNC: 152 MG/DL
TRICHOMONAS (WET PREP): NEGATIVE
TRIGL SERPL-MCNC: 110 MG/DL
WBC (WET PREP): POSITIVE
YEAST (WET PREP): POSITIVE

## 2022-06-07 PROCEDURE — 80061 LIPID PANEL: CPT | Performed by: NURSE PRACTITIONER

## 2022-06-07 PROCEDURE — 87210 SMEAR WET MOUNT SALINE/INK: CPT | Performed by: NURSE PRACTITIONER

## 2022-06-07 PROCEDURE — 99213 OFFICE O/P EST LOW 20 MIN: CPT | Mod: 25 | Performed by: NURSE PRACTITIONER

## 2022-06-07 PROCEDURE — 87102 FUNGUS ISOLATION CULTURE: CPT | Performed by: NURSE PRACTITIONER

## 2022-06-07 PROCEDURE — 83036 HEMOGLOBIN GLYCOSYLATED A1C: CPT

## 2022-06-07 PROCEDURE — G0463 HOSPITAL OUTPT CLINIC VISIT: HCPCS

## 2022-06-07 PROCEDURE — 36415 COLL VENOUS BLD VENIPUNCTURE: CPT | Performed by: NURSE PRACTITIONER

## 2022-06-07 PROCEDURE — 87389 HIV-1 AG W/HIV-1&-2 AB AG IA: CPT

## 2022-06-07 PROCEDURE — 99395 PREV VISIT EST AGE 18-39: CPT | Performed by: NURSE PRACTITIONER

## 2022-06-07 RX ORDER — LEVONORGESTREL/ETHIN.ESTRADIOL 0.1-0.02MG
1 TABLET ORAL DAILY
Qty: 84 TABLET | Refills: 4 | Status: SHIPPED | OUTPATIENT
Start: 2022-06-07 | End: 2023-07-27

## 2022-06-07 ASSESSMENT — ENCOUNTER SYMPTOMS
DYSURIA: 1
HEARTBURN: 0
CONSTIPATION: 1
VOMITING: 0
RECTAL PAIN: 0
BOWEL INCONTINENCE: 0
FLANK PAIN: 0
ABDOMINAL PAIN: 1
JAUNDICE: 0
HEMATURIA: 0
DIFFICULTY URINATING: 0
HOT FLASHES: 0
BLOOD IN STOOL: 0
DECREASED LIBIDO: 0
BLOATING: 1
DIARRHEA: 0
NAUSEA: 1

## 2022-06-07 ASSESSMENT — ANXIETY QUESTIONNAIRES
2. NOT BEING ABLE TO STOP OR CONTROL WORRYING: SEVERAL DAYS
6. BECOMING EASILY ANNOYED OR IRRITABLE: SEVERAL DAYS
GAD7 TOTAL SCORE: 5
1. FEELING NERVOUS, ANXIOUS, OR ON EDGE: MORE THAN HALF THE DAYS
4. TROUBLE RELAXING: NOT AT ALL
GAD7 TOTAL SCORE: 5
GAD7 TOTAL SCORE: 5
5. BEING SO RESTLESS THAT IT IS HARD TO SIT STILL: NOT AT ALL
7. FEELING AFRAID AS IF SOMETHING AWFUL MIGHT HAPPEN: NOT AT ALL
3. WORRYING TOO MUCH ABOUT DIFFERENT THINGS: SEVERAL DAYS
7. FEELING AFRAID AS IF SOMETHING AWFUL MIGHT HAPPEN: NOT AT ALL
8. IF YOU CHECKED OFF ANY PROBLEMS, HOW DIFFICULT HAVE THESE MADE IT FOR YOU TO DO YOUR WORK, TAKE CARE OF THINGS AT HOME, OR GET ALONG WITH OTHER PEOPLE?: SOMEWHAT DIFFICULT

## 2022-06-07 ASSESSMENT — PATIENT HEALTH QUESTIONNAIRE - PHQ9: SUM OF ALL RESPONSES TO PHQ QUESTIONS 1-9: 4

## 2022-06-07 ASSESSMENT — PAIN SCALES - GENERAL: PAINLEVEL: MILD PAIN (2)

## 2022-06-07 NOTE — LETTER
Date:Lillian 15, 2022      Provider requested that no letter be sent. Do not send.       [facility]

## 2022-06-07 NOTE — LETTER
2022       RE: Valerie Rachel  [address] [address]     Dear Colleague,    Thank you for referring your patient, Valerie Rachel, to the [facility] [place] at [facility]. Please see a copy of my visit note below.      Progress Note    SUBJECTIVE:  Valerie Rachel is an 22 year old, , who requests an Annual Preventive Exam.     Concerns today include:   1. Vulvovaginal itching: Patient has reported new onset vulvovaginal itching both inside the vagina and outside on the perineum. Has had chronic yeast infection on and off since her Freshman year of college. Was on suppressive weekly dosing x 6 months of diflucan  starting in July. Has been off diflucan since January and symptoms of yeast (itching) have now returned for the first time.  Reports trying occasional Montistat and Boric acid for relief, but has not tried anything to relieve symptoms currently.  Last positive for yeast in 2021; had neg yeast culture 2021. Yeast culture was pos for candida albicans 5/10/2021.      2. Chronic bladder pain: Has had chronic bladder pain with symptoms of burning and frequency for the past 4 years. Explains that she has followed up with urogynecology & urology in the past with no relief or answers regarding etiology. Utilizes Azo intermittently. Has tried pelvic floor therapy in the past. Denies a Urine Analysis today or further evaluation. Denies any acute symptoms.     3. Refill on COCs: Has been having a monthly menstrual bleed, regular periods lasting ~5 days with moderate flow, moderate pain/cramping. Pleased with this contraceptive method.     Sexual Hx:  - 1 male partner; denies sexual concerns  - Open to STD testing    Last Pap:  NIL, due 2024    Mammogram: n/a  Colonoscopy: n/a    Diet/Exercise:   - does not follow a specific diet, tries to get in fruits and vegetables when able.    - exercising regularly- going  on walks everyday recently       ROS:   ROS: 10 point ROS neg other than the symptoms noted above in the HPI.    LEXUS-7 SCORE 2021   Total Score - - 5 (mild anxiety)   Total Score 4 4 5     PHQ-2 Score:   PHQ 5/10/2021 2021 2022   PHQ-9 Total Score 4 4 4   Q9: Thoughts of better off dead/self-harm past 2 weeks Not at all Not at all Not at all       HISTORY:  cholecalciferol 50 MCG (2000 UT) tablet, Take 2,000 Units by mouth    No current facility-administered medications on file prior to visit.    Allergies   Allergen Reactions     Penicillins Hives and Rash       There is no immunization history on file for this patient.    -Up to Date on COVID/Booster   - no immunizations available in [facility]    OB History    Para Term  AB Living   0 0 0 0 0 0   SAB IAB Ectopic Multiple Live Births   0 0 0 0 0     Past Medical History:   Diagnosis Date     Anxiety      Patellar tendonitis      Recurrent yeast vulvovaginitis      Past Surgical History:   Procedure Laterality Date     NO HISTORY OF SURGERY       No family history on file.  Social History     Socioeconomic History     Marital status: Single   Tobacco Use     Smoking status: Never Smoker     Smokeless tobacco: Never Used   Substance and Sexual Activity     Alcohol use: Yes     Comment: I drink socially on occasion.     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  Skin -  no suspicious lesions or rashes  EENT-  euthyroid with out palpable nodules  Neck - supple without lymphadenopathy.  Lungs - clear to auscultation bilaterally.  Heart - regular rate and rhythm without murmur.  Abdomen - soft, nontender, nondistended, no masses or organomegaly noted.    Pelvic Exam:  EG/BUS: Normal genital architecture without lesions, erythema or abnormal secretions.   Urethral meatus: normal   Urethra: no masses, tenderness, or scarring   Vagina: moist, pink, rugae with creamy, white and odorles  secretions  Cervix: no lesions, closed, pink    Wet Prep: +Yeast, -Trich, - Clue cells, PH= 3.6    Assessment:     Encounter Diagnoses   Name Primary?     Visit for preventive health examination Yes     Oral contraception initial prescription      Screening for diabetes mellitus      Recurrent candidiasis of vagina      Screening for lipid disorders      Vaginal itching      Screen for STD (sexually transmitted disease)        PLAN:   1. Refill provided for OCP  2. Draw Hgb A1C & HIV Antigen due to recurrent candidiasis  3. Draw Lipid profile for preventive health labs.   4. Wet Prep POC- positive for yeast due to recurrence, also sent yeast culture. Rx placed for diflucan 150 mg q3 days x3 doses. Will connect with patient once results available to determine if she would like to resume prophylactic therapy.   5. Next pap smear due 2024    Additional teaching done at this visit regarding calcium (1200 mg per day), mental health, and exercise.    Return to clinic in one year.  Follow-up as needed.    I, Dewayne Garza, am serving as a scribe; to document services personally performed by  Dr. Varghese based on data collection and the provider's statements to me.     Dewayne Garza    I agree with the PFSH and ROS as completed by the WHNP Student, except for changes made by me.  The remainder of the encounter was performed by me and scribed by the WHNP Student.  The scribed note accurately reflects my  personal services and decisions made by me.  Nena Varghese, ASHOK, APRN, WHNP                Again, thank you for allowing me to participate in the care of your patient.      Sincerely,    BEN Florian CNP

## 2022-06-07 NOTE — PROGRESS NOTES
Progress Note    SUBJECTIVE:  Valerie Rachel is an 22 year old, , who requests an Annual Preventive Exam.     Concerns today include:   1. Vulvovaginal itching: Patient has reported new onset vulvovaginal itching both inside the vagina and outside on the perineum. Has had chronic yeast infection on and off since her Freshman year of college. Was on suppressive weekly dosing x 6 months of diflucan  starting in July. Has been off diflucan since January and symptoms of yeast (itching) have now returned for the first time.  Reports trying occasional Montistat and Boric acid for relief, but has not tried anything to relieve symptoms currently.  Last positive for yeast in 2021; had neg yeast culture 2021. Yeast culture was pos for candida albicans 5/10/2021.      2. Chronic bladder pain: Has had chronic bladder pain with symptoms of burning and frequency for the past 4 years. Explains that she has followed up with urogynecology & urology in the past with no relief or answers regarding etiology. Utilizes Azo intermittently. Has tried pelvic floor therapy in the past. Denies a Urine Analysis today or further evaluation. Denies any acute symptoms.     3. Refill on COCs: Has been having a monthly menstrual bleed, regular periods lasting ~5 days with moderate flow, moderate pain/cramping. Pleased with this contraceptive method.     Sexual Hx:  - 1 male partner; denies sexual concerns  - Open to STD testing    Last Pap:  NIL, due 2024    Mammogram: n/a  Colonoscopy: n/a    Diet/Exercise:   - does not follow a specific diet, tries to get in fruits and vegetables when able.    - exercising regularly- going on walks everyday recently       ROS:   ROS: 10 point ROS neg other than the symptoms noted above in the HPI.    LEXUS-7 SCORE 2021   Total Score - - 5 (mild anxiety)   Total Score 4 4 5     PHQ-2 Score:   PHQ 5/10/2021 2021 2022   PHQ-9 Total Score 4 4 4   Q9: Thoughts of  better off dead/self-harm past 2 weeks Not at all Not at all Not at all       HISTORY:  cholecalciferol 50 MCG ( UT) tablet, Take 2,000 Units by mouth    No current facility-administered medications on file prior to visit.    Allergies   Allergen Reactions     Penicillins Hives and Rash       There is no immunization history on file for this patient.    -Up to Date on COVID/Booster   - no immunizations available in [facility]    OB History    Para Term  AB Living   0 0 0 0 0 0   SAB IAB Ectopic Multiple Live Births   0 0 0 0 0     Past Medical History:   Diagnosis Date     Anxiety      Patellar tendonitis      Recurrent yeast vulvovaginitis      Past Surgical History:   Procedure Laterality Date     NO HISTORY OF SURGERY       No family history on file.  Social History     Socioeconomic History     Marital status: Single   Tobacco Use     Smoking status: Never Smoker     Smokeless tobacco: Never Used   Substance and Sexual Activity     Alcohol use: Yes     Comment: I drink socially on occasion.     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  Skin - no suspicious lesions or rashes  EENT-  euthyroid with out palpable nodules  Neck - supple without lymphadenopathy.  Lungs - clear to auscultation bilaterally.  Heart - regular rate and rhythm without murmur.  Abdomen - soft, nontender, nondistended, no masses or organomegaly noted.    Pelvic  Exam:  EG/BUS: Normal genital architecture without lesions, erythema or abnormal secretions.   Urethral meatus: normal   Urethra: no masses, tenderness, or scarring   Vagina: moist, pink, rugae with creamy, white and odorles  secretions  Cervix: no lesions, closed, pink    Wet Prep: +Yeast, -Trich, - Clue cells, PH= 3.6    Assessment:     Encounter Diagnoses   Name Primary?     Visit for preventive health examination Yes     Oral contraception initial prescription      Screening for diabetes mellitus      Recurrent candidiasis of vagina      Screening for lipid disorders      Vaginal itching      Screen for STD (sexually transmitted disease)        PLAN:   1. Refill provided for OCP  2. Draw Hgb A1C & HIV Antigen due to recurrent candidiasis  3. Draw Lipid profile for preventive health labs.   4. Wet Prep POC- positive for yeast due to recurrence, also sent yeast culture. Rx placed for diflucan 150 mg q3 days x3 doses. Will connect with patient once results available to determine if she would like to resume prophylactic therapy.   5. Next pap smear due 2024    Additional teaching done at this visit regarding calcium (1200 mg per day), mental health, and exercise.    Return to clinic in one year.  Follow-up as needed.    I, Dewayne Garza, am serving as a scribe; to document services personally performed by  Dr. Varghese based on data collection and the provider's statements to me.     Dewayne Garza    I agree with the PFSH and ROS as completed by the NP Student, except for changes made by me.  The remainder of the encounter was performed by me and scribed by the WHNP Student.  The scribed note accurately reflects my personal services and decisions made by me.  Nena Varghese, DNP, APRN, RIGOBERTO

## 2022-06-08 ENCOUNTER — TELEPHONE (OUTPATIENT)
Dept: OBGYN | Facility: CLINIC | Age: 22
End: 2022-06-08
Payer: COMMERCIAL

## 2022-06-08 DIAGNOSIS — B37.31 RECURRENT CANDIDIASIS OF VAGINA: ICD-10-CM

## 2022-06-08 DIAGNOSIS — Z13.220 SCREENING FOR LIPID DISORDERS: Primary | ICD-10-CM

## 2022-06-08 LAB — HIV 1+2 AB+HIV1 P24 AG SERPL QL IA: NONREACTIVE

## 2022-06-08 RX ORDER — FLUCONAZOLE 150 MG/1
150 TABLET ORAL DAILY
Qty: 3 TABLET | Refills: 0 | Status: SHIPPED | OUTPATIENT
Start: 2022-06-08 | End: 2022-06-11

## 2022-06-08 NOTE — TELEPHONE ENCOUNTER
[facility] Call Center    Phone Message    May a detailed message be left on voicemail: yes     Reason for Call: Patient states she was supposed to get a prescription send for a yeast infection and has not received it yet. Would like it sent to [id number] [place], [place] - [address]  AT [facility]    Please reach out to patient. Thank you    Action Taken: Message routed to:  Other: S    Travel Screening: Not Applicable

## 2022-06-08 NOTE — TELEPHONE ENCOUNTER
"Reviewed visit note from 6/7- \"Rx placed for diflucan 150 mg q3 days x3 doses\" On review of chart, no rx was sent.    This RN sending rx as noted above and patient notified via [employer].   "

## 2022-06-13 LAB — BACTERIA VAG AEROBE CULT: NO GROWTH

## 2022-09-25 ENCOUNTER — HEALTH MAINTENANCE LETTER (OUTPATIENT)
Age: 22
End: 2022-09-25

## 2023-01-25 ENCOUNTER — MYC MEDICAL ADVICE (OUTPATIENT)
Dept: OBGYN | Facility: CLINIC | Age: 23
End: 2023-01-25
Payer: COMMERCIAL

## 2023-01-25 ENCOUNTER — LAB (OUTPATIENT)
Dept: LAB | Facility: CLINIC | Age: 23
End: 2023-01-25
Payer: COMMERCIAL

## 2023-01-25 DIAGNOSIS — R39.9 UTI SYMPTOMS: ICD-10-CM

## 2023-01-25 DIAGNOSIS — R39.9 UTI SYMPTOMS: Primary | ICD-10-CM

## 2023-01-25 LAB
ALBUMIN UR-MCNC: NEGATIVE MG/DL
APPEARANCE UR: CLEAR
BACTERIA #/AREA URNS HPF: ABNORMAL /HPF
BILIRUB UR QL STRIP: NEGATIVE
COLOR UR AUTO: YELLOW
GLUCOSE UR STRIP-MCNC: NEGATIVE MG/DL
HGB UR QL STRIP: ABNORMAL
KETONES UR STRIP-MCNC: NEGATIVE MG/DL
LEUKOCYTE ESTERASE UR QL STRIP: NEGATIVE
NITRATE UR QL: NEGATIVE
PH UR STRIP: 5.5 [PH] (ref 5–7)
RBC #/AREA URNS AUTO: ABNORMAL /HPF
SP GR UR STRIP: 1.01 (ref 1–1.03)
SQUAMOUS #/AREA URNS AUTO: ABNORMAL /LPF
UROBILINOGEN UR STRIP-ACNC: 0.2 E.U./DL
WBC #/AREA URNS AUTO: ABNORMAL /HPF

## 2023-01-25 PROCEDURE — 81001 URINALYSIS AUTO W/SCOPE: CPT

## 2023-03-07 ENCOUNTER — VIRTUAL VISIT (OUTPATIENT)
Dept: UROLOGY | Facility: CLINIC | Age: 23
End: 2023-03-07
Attending: OBSTETRICS & GYNECOLOGY
Payer: COMMERCIAL

## 2023-03-07 ENCOUNTER — MYC MEDICAL ADVICE (OUTPATIENT)
Dept: UROLOGY | Facility: CLINIC | Age: 23
End: 2023-03-07

## 2023-03-07 VITALS — BODY MASS INDEX: 20.98 KG/M2 | WEIGHT: 130 LBS

## 2023-03-07 DIAGNOSIS — R39.82 CHRONIC BLADDER PAIN: Primary | ICD-10-CM

## 2023-03-07 PROCEDURE — 99214 OFFICE O/P EST MOD 30 MIN: CPT | Mod: VID | Performed by: OBSTETRICS & GYNECOLOGY

## 2023-03-07 PROCEDURE — G0463 HOSPITAL OUTPT CLINIC VISIT: HCPCS | Mod: PN,GT | Performed by: OBSTETRICS & GYNECOLOGY

## 2023-03-07 ASSESSMENT — PAIN SCALES - GENERAL: PAINLEVEL: MILD PAIN (2)

## 2023-03-07 NOTE — LETTER
3/7/2023       RE: Valerie Rachel  [address]  [facility] [address]     Dear Colleague,    Thank you for referring your patient, Valerie Rachel, to the [facility] [place] at [facility]. Please see a copy of my visit note below.    Video-Visit Details    Type of service:  Video Visit    Video Start Time (time video started): 1:37 pm    Video End Time (time video stopped): 2:08 pm    Originating Location (pt. Location): Home    Distant Location (provider location):  On-site    Mode of Communication:  Video Conference via [employer]      Chief complaint: bladder pain  Subjective     Valerie Rachel is a 22 year old year old  G0 female who presents for a video visit today for  Bladder pain since 2019. Says it started with burning with urination/urgency/frequency and she was diagnosed with UTI (not cultured) and was treated with antibiotics and the medication wasn't working . They tried a second antibiotics and a third antibiotics without any improvement. Her urine culture which was sent after the second round of antibiotics came back negative. Since then she has random flare ups of symptoms ( burning at the end of urination, post void pain in bladder (for a few hours)). The flare ups last for a couple of days at a time. For a while she wouldn't have any flare ups for months at a time but on average about once a months. The flare ups are not associated with intercourse. Dehydration and stress makes it worse.  She says she has had many urine cultures in the last 3 years (2 a year or so) by her provider in [place] and they have all come back negative. No gross hematuria. No history of bladder or kidney stones. No hx of bladder cancer in family. No  Tobacco. Drinks mostly water , little caffeine, alcohol only about once a week.     She had worked with a pelvic floor physical therapist in 2020 ( 4 visits) and says that has helped some.  Seeing a counsellor for stress. Also changed her job and is in a less stressful environment.     UA on 23 showed few bacteria but otherwise no signs of infection or microscopic hematuria.     Urinary symptoms  Denies bothersome stress or urgency leakage. Denies urinary urgency, frequency, nocturia. Denies difficulty with voiding     Prolapse symptoms  Denies vaginal bulge, pressure sensation or protrusion.    GI symptoms  Denies chronic diarrhea, constipation. Denies bothersome fecal or flatal incontinence. Denies defecatory difficulties.     Sexual health/Pelvic floor  Sexually active. No pain.     Relevant Medical History:    Diabetes? no  High Blood pressure? no     Recurrent UTIs? ??  Sleep Apnea? no  Obesity? no  History of Blood clots? no  Other medical problems: none    Surgical History:      Past Surgical History:   Procedure Laterality Date     NO HISTORY OF SURGERY         OB/Gyn History:  OB History    Para Term  AB Living   0 0 0 0 0 0   SAB IAB Ectopic Multiple Live Births   0 0 0 0 0       Medications/Vitamins/Supplements:   Current Outpatient Medications   Medication     cholecalciferol 50 MCG (2000) tablet     levonorgestrel-ethinyl estradiol (AVIANE) 0.1-20 MG-MCG tablet     No current facility-administered medications for this visit.         Medical History:      Past Medical History:   Diagnosis Date     Anxiety      Patellar tendonitis      Recurrent yeast vulvovaginitis      ROS  Social History    Social History     Socioeconomic History     Marital status: Single     Spouse name: Not on file     Number of children: Not on file     Years of education: Not on file     Highest education level: Not on file   Occupational History     Not on file   Tobacco Use     Smoking status: Never     Smokeless tobacco: Never   Substance and Sexual Activity     Alcohol use: Yes     Comment: I drink socially on occasion. 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    Allergy    Allergies   Allergen Reactions     Penicillins Hives and Rash       Current Outpatient Medications   Medication     cholecalciferol 50 MCG (2000 UT) tablet     levonorgestrel-ethinyl estradiol (AVIANE) 0.1-20 MG-MCG tablet     No current facility-administered medications for this visit.       Objective:   Deferred ( virtual visit)     Asssessment and plan  Encounter Diagnosis   Name Primary?     Chronic bladder pain Yes     Pain flare ups on and off. Mostly with voiding (not with bladder filling). Started with what sounds like a UTI.   Will bring her in for cystoscopy, urine culture ( cath), and pelvic exam ( to assess for pelvic muscle tension/pain)  No bladder irritants.   Stress seems to be associated factor. Is working with a counsellor. Also changed jobs which is helping. Discussed other relaxation daily practices.            I spent a total of 30 minutes on  Video with  Valerie Rachel on the date of the encounter in chart review, patient visit, review of tests, documentation and/or discussion with other providers about the issues documented above.               Again, thank you for allowing me to participate in the care of your patient.      Sincerely,    Alem Nance MD

## 2023-03-07 NOTE — PROGRESS NOTES
Video-Visit Details    Type of service:  Video Visit    Video Start Time (time video started): 1:37 pm    Video End Time (time video stopped): 2:08 pm    Originating Location (pt. Location): Home    Distant Location (provider location):  On-site    Mode of Communication:  Video Conference via [employer]      Chief complaint: bladder pain  Subjective     Valerie Rachel is a 22 year old year old  G0 female who presents for a video visit today for  Bladder pain since 2019. Says it started with burning with urination/urgency/frequency and she was diagnosed with UTI (not cultured) and was treated with antibiotics and the medication wasn't working . They tried a second antibiotics and a third antibiotics without any improvement. Her urine culture which was sent after the second round of antibiotics came back negative. Since then she has random flare ups of symptoms ( burning at the end of urination, post void pain in bladder (for a few hours)). The flare ups last for a couple of days at a time. For a while she wouldn't have any flare ups for months at a time but on average about once a months. The flare ups are not associated with intercourse. Dehydration and stress makes it worse.  She says she has had many urine cultures in the last 3 years (2 a year or so) by her provider in [place] and they have all come back negative. No gross hematuria. No history of bladder or kidney stones. No hx of bladder cancer in family. No  Tobacco. Drinks mostly water , little caffeine, alcohol only about once a week.     She had worked with a pelvic floor physical therapist in 2020 ( 4 visits) and says that has helped some. Seeing a counsellor for stress. Also changed her job and is in a less stressful environment.     UA on 1.25.23 showed few bacteria but otherwise no signs of infection or microscopic hematuria.     Urinary symptoms  Denies bothersome stress or urgency leakage. Denies urinary urgency, frequency, nocturia. Denies  difficulty with voiding     Prolapse symptoms  Denies vaginal bulge, pressure sensation or protrusion.    GI symptoms  Denies chronic diarrhea, constipation. Denies bothersome fecal or flatal incontinence. Denies defecatory difficulties.     Sexual health/Pelvic floor  Sexually active. No pain.     Relevant Medical History:    Diabetes? no  High Blood pressure? no     Recurrent UTIs? ??  Sleep Apnea? no  Obesity? no  History of Blood clots? no  Other medical problems: none    Surgical History:      Past Surgical History:   Procedure Laterality Date     NO HISTORY OF SURGERY         OB/Gyn History:  OB History    Para Term  AB Living   0 0 0 0 0 0   SAB IAB Ectopic Multiple Live Births   0 0 0 0 0       Medications/Vitamins/Supplements:   Current Outpatient Medications   Medication     cholecalciferol 50 MCG (2000) tablet     levonorgestrel-ethinyl estradiol (AVIANE) 0.1-20 MG-MCG tablet     No current facility-administered medications for this visit.         Medical History:      Past Medical History:   Diagnosis Date     Anxiety      Patellar tendonitis      Recurrent yeast vulvovaginitis      ROS  Social History    Social History     Socioeconomic History     Marital status: Single     Spouse name: Not on file     Number of children: Not on file     Years of education: Not on file     Highest education level: Not on file   Occupational History     Not on file   Tobacco Use     Smoking status: Never     Smokeless tobacco: Never   Substance and Sexual Activity     Alcohol use: Yes     Comment: I drink socially on occasion. 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    Allergy    Allergies   Allergen Reactions     Penicillins Hives and Rash       Current Outpatient Medications   Medication     cholecalciferol 50 MCG (2000 UT) tablet     levonorgestrel-ethinyl estradiol (AVIANE) 0.1-20 MG-MCG tablet     No current facility-administered medications for this visit.       Objective:   Deferred ( virtual visit)     Asssessment and plan  Encounter Diagnosis   Name Primary?     Chronic bladder pain Yes     Pain flare ups on and off. Mostly with voiding (not with bladder filling). Started with what sounds like a UTI.   Will bring her in for cystoscopy, urine culture ( cath), and pelvic exam ( to assess for pelvic muscle tension/pain)  No bladder irritants.   Stress seems to be associated factor. Is working with a counsellor. Also changed jobs which is helping. Discussed other relaxation daily practices.            I spent a total of 30 minutes on  Video with  Valerie Rachel on the date of the encounter in chart review, patient visit, review of tests, documentation and/or discussion with other providers about the issues documented above.

## 2023-03-07 NOTE — NURSING NOTE
Is the patient currently in the state of [place]? YES    Visit mode:VIDEO    If the visit is dropped, the patient can be reconnected by: VIDEO VISIT: Text to cell phone: [phone]    Will anyone else be joining the visit? NO    How would you like to obtain your AVS? MyChart    Are changes needed to the allergy or medication list? NO    Reason for visit:  Dysuria    Sierra Saenz, VF/LPN

## 2023-03-07 NOTE — LETTER
Date:March 8, 2023      Provider requested that no letter be sent. Do not send.       [facility]

## 2023-03-07 NOTE — Clinical Note
Hi team, I would like to get this patient in for cystoscopy with me at the [facility] ( and pelvic exam). Can we schedule her?   Thanks!

## 2023-04-18 ENCOUNTER — PRE VISIT (OUTPATIENT)
Dept: UROLOGY | Facility: CLINIC | Age: 23
End: 2023-04-18
Payer: COMMERCIAL

## 2023-04-18 NOTE — TELEPHONE ENCOUNTER
Reason for Visit: Cystoscopy    Diagnosis: Bladder pain    Orders/Procedures/Records: in system    Contact Patient: n/a    Rooming Requirements: UA dip prior to getting ready for cystoscopy. If positive for Leuks and/or Nitrites, check with provider.      Yamileth Foley LPN  04/18/23  11:45 AM

## 2023-04-28 ENCOUNTER — OFFICE VISIT (OUTPATIENT)
Dept: UROLOGY | Facility: CLINIC | Age: 23
End: 2023-04-28
Payer: COMMERCIAL

## 2023-04-28 VITALS
SYSTOLIC BLOOD PRESSURE: 129 MMHG | DIASTOLIC BLOOD PRESSURE: 85 MMHG | WEIGHT: 130 LBS | HEIGHT: 66 IN | BODY MASS INDEX: 20.89 KG/M2 | HEART RATE: 79 BPM

## 2023-04-28 DIAGNOSIS — R39.82 CHRONIC BLADDER PAIN: Primary | ICD-10-CM

## 2023-04-28 LAB
ALBUMIN UR-MCNC: NEGATIVE MG/DL
APPEARANCE UR: CLEAR
BILIRUB UR QL STRIP: NEGATIVE
COLOR UR AUTO: YELLOW
GLUCOSE UR STRIP-MCNC: NEGATIVE MG/DL
HGB UR QL STRIP: ABNORMAL
KETONES UR STRIP-MCNC: NEGATIVE MG/DL
LEUKOCYTE ESTERASE UR QL STRIP: NEGATIVE
NITRATE UR QL: NEGATIVE
PH UR STRIP: 6 [PH] (ref 5–8)
SP GR UR STRIP: <=1.005 (ref 1–1.03)
UROBILINOGEN UR STRIP-ACNC: 0.2 E.U./DL

## 2023-04-28 PROCEDURE — 87086 URINE CULTURE/COLONY COUNT: CPT | Performed by: OBSTETRICS & GYNECOLOGY

## 2023-04-28 PROCEDURE — 52000 CYSTOURETHROSCOPY: CPT | Performed by: OBSTETRICS & GYNECOLOGY

## 2023-04-28 PROCEDURE — 81003 URINALYSIS AUTO W/O SCOPE: CPT | Performed by: PATHOLOGY

## 2023-04-28 RX ORDER — LIDOCAINE HYDROCHLORIDE 20 MG/ML
JELLY TOPICAL ONCE
Status: COMPLETED | OUTPATIENT
Start: 2023-04-28 | End: 2023-04-28

## 2023-04-28 RX ADMIN — LIDOCAINE HYDROCHLORIDE: 20 JELLY TOPICAL at 14:08

## 2023-04-28 ASSESSMENT — PAIN SCALES - GENERAL: PAINLEVEL: NO PAIN (0)

## 2023-04-28 NOTE — LETTER
"2023       RE: Valerie Rachel  [address]  [facility] [address]     Dear Colleague,    Thank you for referring your patient, Valerie Rachel, to the [facility] [place] at [facility]. Please see a copy of my visit note below.    Chief Complaint   Patient presents with    Cystoscopy       Blood pressure 129/85, pulse 79, height 1.676 m (5' 6\"), weight 59 kg (130 lb), last menstrual period 2023, not currently breastfeeding. Body mass index is 20.98 kg/m .    Patient Active Problem List   Diagnosis    Acne vulgaris    Chronic bladder pain    Patellar tendonitis    Vaginitis       Allergies   Allergen Reactions    Penicillins Hives and Rash       Current Outpatient Medications   Medication Sig Dispense Refill    cholecalciferol 50 MCG (2000 UT) tablet Take 2,000 Units by mouth      levonorgestrel-ethinyl estradiol (AVIANE) 0.1-20 MG-MCG tablet Take 1 tablet by mouth daily 84 tablet 4       Social History     Tobacco Use    Smoking status: Never    Smokeless tobacco: Never   Substance Use Topics    Alcohol use: Yes     Comment: I drink socially on occasion. 1/w    Drug use: Never       Invasive Procedure Safety Checklist:    Procedure: Cystoscopy    Action: Complete sections and checkboxes as appropriate.    Pre-procedure:  1. Patient ID Verified with 2 identifiers (Maren and  or MRN) : YES    2. Procedure and site verified with patient/designee (when able) : YES    3. Accurate consent documentation in medical record : YES    4. H&P (or appropriate assessment) documented in medical record : N/A  H&P must be up to 30 days prior to procedure an updated within 24 hours of                 Procedure as applicable.     5. Relevant diagnostic and radiology test results appropriately labeled and displayed as applicable : YES    6. Blood products, implants, devices, and/or special equipment available for the procedure as applicable : " YES    7. Procedure site(s) marked with provider initials [Exclusions: none] : NO    8. Marking not required. Reason : Yes  Procedure does not require site marking    Time Out:     Time-Out performed immediately prior to starting procedure, including verbal and active participation of all team members addressing: YES    1. Correct patient identity.  2. Confirmed that the correct side and site are marked.  3. An accurate procedure to be done.  4. Agreement on the procedure to be done.  5. Correct patient position.  6. Relevant images and results are properly labeled and appropriately displayed.  7. The need to administer antibiotics or fluids for irrigation purposes during the procedure as applicable.  8. Safety precautions based on patient history or medication use.    During Procedure: Verification of correct person, site, and procedure occurs any time the responsibility for care of the patient is transferred to another member of the care team.      The following medication was given:     MEDICATION:  Lidocaine without epinephrine 2% jelly  ROUTE: urethral   SITE: urethral   DOSE: 10 mL  LOT #: [id number]  : [employer]  EXPIRATION DATE: 10/2024  NDC#: [address]-5  Was there drug waste? No    Prior to med admin, verified patient identity using patient's name and date of birth.  Due to med administration, patient instructed to remain in clinic for 15 minutes  afterwards, and to report any adverse reaction to me immediately.    Drug Amount Wasted:  None.  Vial/Syringe: Syringe    Afsaneh Ashford  4/28/2023 April 28, 2023    Referring Provider: Referred Self, MD  No address on file    Primary Care Provider: No Ref-Primary, Physician    HPI:  Valerie Rachel is a 23 year old female who presents for evaluation of her bladder pain and irritative bladder symptoms in the absence of infection. I saw her virtually on 3.7.23 and the plan today is for pelvic exam and cystoscopy    This  note was copied and pasted from virtual visit with Dr Nance on 3.7.23      Chief complaint: bladder pain  Subjective     Valerie Rachel is a 22 year old year old  G0 female who presents for a video visit today for  Bladder pain since 2019. Says it started with burning with urination/urgency/frequency and she was diagnosed with UTI (not cultured) and was treated with antibiotics and the medication wasn't working . They tried a second antibiotics and a third antibiotics without any improvement. Her urine culture which was sent after the second round of antibiotics came back negative. Since then she has random flare ups of symptoms ( burning at the end of urination, post void pain in bladder (for a few hours)). The flare ups last for a couple of days at a time. For a while she wouldn't have any flare ups for months at a time but on average about once a months. The flare ups are not associated with intercourse. Dehydration and stress makes it worse.  She says she has had many urine cultures in the last 3 years (2 a year or so) by her provider in [place] and they have all come back negative. No gross hematuria. No history of bladder or kidney stones. No hx of bladder cancer in family. No  Tobacco. Drinks mostly water , little caffeine, alcohol only about once a week.     She had worked with a pelvic floor physical therapist in 2020 ( 4 visits) and says that has helped some. Seeing a counsellor for stress. Also changed her job and is in a less stressful environment.     UA on 1.25.23 showed few bacteria but otherwise no signs of infection or microscopic hematuria.     Urinary symptoms  Denies bothersome stress or urgency leakage. Denies urinary urgency, frequency, nocturia. Denies difficulty with voiding     Prolapse symptoms  Denies vaginal bulge, pressure sensation or protrusion.    GI symptoms  Denies chronic diarrhea, constipation. Denies bothersome fecal or flatal incontinence. Denies defecatory difficulties.      Sexual health/Pelvic floor  Sexually active. No pain.     Relevant Medical History:    Diabetes? no  High Blood pressure? no     Recurrent UTIs? ??  Sleep Apnea? no  Obesity? no  History of Blood clots? no  Other medical problems: none         Surgical History:      Past Surgical History:   Procedure Laterality Date    NO HISTORY OF SURGERY         OB/Gyn History:  OB History    Para Term  AB Living   0 0 0 0 0 0   SAB IAB Ectopic Multiple Live Births   0 0 0 0 0       Medications/Vitamins/Supplements:   Current Outpatient Medications   Medication    cholecalciferol 50 MCG (2000) tablet    levonorgestrel-ethinyl estradiol (AVIANE) 0.1-20 MG-MCG tablet     No current facility-administered medications for this visit.         Medical History:      Past Medical History:   Diagnosis Date    Anxiety     Patellar tendonitis     Recurrent yeast vulvovaginitis      ROS  Social History    Social History     Socioeconomic History    Marital status: Single     Spouse name: Not on file    Number of children: Not on file    Years of education: Not on file    Highest education level: Not on file   Occupational History    Not on file   Tobacco Use    Smoking status: Never    Smokeless tobacco: Never   Vaping Use    Vaping status: Not on file   Substance and Sexual Activity    Alcohol use: Yes     Comment: I drink socially on occasion. 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    Allergy    Allergies   Allergen Reactions    Penicillins Hives and Rash       Current Outpatient Medications   Medication    cholecalciferol 50 MCG (2000 UT) tablet    levonorgestrel-ethinyl estradiol (AVIANE) 0.1-20 MG-MCG tablet     No current facility-administered medications for this visit.       Physical Exam: /85   Pulse 79   Ht 1.676 m (5' 6\")   Wt 59 kg (130 lb)   LMP 02/21/2023 (Approximate)   BMI 20.98 kg/m      Gen:  is alert, comfortable in no acute distress,   Abdomen: Abdomen is soft, non-tender, non-distended,   Lungs: non-labored breathing.     Pelvic Exam:   Normal external female genitalia. The urethra was normal.    Vagina: normal epithelium, good support, no abnormal discharge  Uterus: deferred  Ovaries: deferred  Vulva: no lesions, no vesibular pain  Rectal: deferred    Pelvic floor strength: 2/5 kegels.    Pelvic floor muscles: no myalgia. Elevated pelvic floor muscle tone with poor relaxation    Voiding trial:    PVR 50 mL by straight cath    Labs:   Color Urine (no units)   Date Value   01/25/2023 Yellow     Appearance Urine (no units)   Date Value   01/25/2023 Clear     Glucose Urine (mg/dL)   Date Value   01/25/2023 Negative     Bilirubin Urine (no units)   Date Value   01/25/2023 Negative     Ketones Urine (mg/dL)   Date Value   01/25/2023 Negative     Specific Gravity Urine (no units)   Date Value   01/25/2023 1.010     pH Urine (no units)   Date Value   01/25/2023 5.5     Protein Albumin Urine (mg/dL)   Date Value   01/25/2023 Negative     Urobilinogen Urine (E.U./dL)   Date Value   01/25/2023 0.2     Nitrite Urine (no units)   Date Value   01/25/2023 Negative     Leukocyte Esterase Urine (no units) "   Date Value   01/25/2023 Negative     Office cystoscopy    Patient was verbally consented for cystoscopy after risks (urethral discomfort, irritative bladder, uti etc) and indications/benefits were discussed    External urethral meatus was cleaned with betadine and lidojet place at urethral meatus for comfort    Cystourethroscopy with a flexible scope performed showed normal bladder and urethral mucosa without evidence of trauma, lesions, or foreign bodies. Bilaterally normally effluxing ureters noted.     Patient tolerated the procedure well.       A/P: Valerie Rachel is a 23 year old F with     Valerie was seen today for cystoscopy.    Diagnoses and all orders for this visit:    Chronic bladder pain  -     lidocaine (XYLOCAINE) 2 % external gel  -     Urine Culture Aerobic Bacterial; Future  -     CYSTOURETHROSCOPY  -     Physical Therapy Referral; Future    Other orders  -     [place] Urine Macroscopic POCT        Cystoscopy is normal. No evidence of lesions /scarring.   She would like to start with pelvic PT (to work on pelvic floor relaxation) as this has helped in the past.   Referral placed  Follow up as needed        Sincerely,    Alem Nance MD

## 2023-04-28 NOTE — PROGRESS NOTES
April 28, 2023    Referring Provider: Referred Self, MD  No address on file    Primary Care Provider: No Ref-Primary, Physician    HPI:  Valerie Rachel is a 23 year old female who presents for evaluation of her bladder pain and irritative bladder symptoms in the absence of infection. I saw her virtually on 3.7.23 and the plan today is for pelvic exam and cystoscopy    This note was copied and pasted from virtual visit with Dr Nance on 3.7.23      Chief complaint: bladder pain  Subjective     Valerie Rachel is a 22 year old year old   female who presents for a video visit today for  Bladder pain since 2019. Says it started with burning with urination/urgency/frequency and she was diagnosed with UTI (not cultured) and was treated with antibiotics and the medication wasn't working . They tried a second antibiotics and a third antibiotics without any improvement. Her urine culture which was sent after the second round of antibiotics came back negative. Since then she has random flare ups of symptoms ( burning at the end of urination, post void pain in bladder (for a few hours)). The flare ups last for a couple of days at a time. For a while she wouldn't have any flare ups for months at a time but on average about once a months. The flare ups are not associated with intercourse. Dehydration and stress makes it worse.  She says she has had many urine cultures in the last 3 years (2 a year or so) by her provider in [place] and they have all come back negative. No gross hematuria. No history of bladder or kidney stones. No hx of bladder cancer in family. No  Tobacco. Drinks mostly water , little caffeine, alcohol only about once a week.     She had worked with a pelvic floor physical therapist in 2020 ( 4 visits) and says that has helped some. Seeing a counsellor for stress. Also changed her job and is in a less stressful environment.     UA on 1.25.23 showed few bacteria but otherwise no signs of infection or  microscopic hematuria.     Urinary symptoms  Denies bothersome stress or urgency leakage. Denies urinary urgency, frequency, nocturia. Denies difficulty with voiding     Prolapse symptoms  Denies vaginal bulge, pressure sensation or protrusion.    GI symptoms  Denies chronic diarrhea, constipation. Denies bothersome fecal or flatal incontinence. Denies defecatory difficulties.     Sexual health/Pelvic floor  Sexually active. No pain.     Relevant Medical History:    Diabetes? no  High Blood pressure? no     Recurrent UTIs? ??  Sleep Apnea? no  Obesity? no  History of Blood clots? no  Other medical problems: none         Surgical History:      Past Surgical History:   Procedure Laterality Date     NO HISTORY OF SURGERY         OB/Gyn History:  OB History    Para Term  AB Living   0 0 0 0 0 0   SAB IAB Ectopic Multiple Live Births   0 0 0 0 0       Medications/Vitamins/Supplements:   Current Outpatient Medications   Medication     cholecalciferol 50 MCG (2000) tablet     levonorgestrel-ethinyl estradiol (AVIANE) 0.1-20 MG-MCG tablet     No current facility-administered medications for this visit.         Medical History:      Past Medical History:   Diagnosis Date     Anxiety      Patellar tendonitis      Recurrent yeast vulvovaginitis      ROS  Social History    Social History     Socioeconomic History     Marital status: Single     Spouse name: Not on file     Number of children: Not on file     Years of education: Not on file     Highest education level: Not on file   Occupational History     Not on file   Tobacco Use     Smoking status: Never     Smokeless tobacco: Never   Vaping Use     Vaping status: Not on file   Substance and Sexual Activity     Alcohol use: Yes     Comment: I drink socially on occasion. 1/w     Drug use: Never     Sexual activity: Yes     Partners: Male     Birth control/protection: Pull-out method, Condom, Pill   Other Topics Concern     Not on file   Social History  "Narrative    Work:  for [employer]    ANGELA Gonzalez grad: Advertising    Grew up in [place]    Partner lives in [place]     Social Determinants of Health     Financial Resource Strain: Not on file   Food Insecurity: Not on file   Transportation Needs: Not on file   Physical Activity: Not on file   Stress: Not on file   Social Connections: Not on file   Intimate Partner Violence: Not At Risk (8/11/2021)    Humiliation, Afraid, Rape, and Kick questionnaire      Fear of Current or Ex-Partner: No      Emotionally Abused: No      Physically Abused: No      Sexually Abused: No   Housing Stability: Not on file       Family History  No family history on file.    Allergy    Allergies   Allergen Reactions     Penicillins Hives and Rash       Current Outpatient Medications   Medication     cholecalciferol 50 MCG (2000 UT) tablet     levonorgestrel-ethinyl estradiol (AVIANE) 0.1-20 MG-MCG tablet     No current facility-administered medications for this visit.       Physical Exam: /85   Pulse 79   Ht 1.676 m (5' 6\")   Wt 59 kg (130 lb)   LMP 02/21/2023 (Approximate)   BMI 20.98 kg/m      Gen:  is alert, comfortable in no acute distress,   Abdomen: Abdomen is soft, non-tender, non-distended,   Lungs: non-labored breathing.     Pelvic Exam:   Normal external female genitalia. The urethra was normal.    Vagina: normal epithelium, good support, no abnormal discharge  Uterus: deferred  Ovaries: deferred  Vulva: no lesions, no vesibular pain  Rectal: deferred    Pelvic floor strength: 2/5 kegels.    Pelvic floor muscles: no myalgia. Elevated pelvic floor muscle tone with poor relaxation    Voiding trial:    PVR 50 mL by straight cath    Labs:   Color Urine (no units)   Date Value   01/25/2023 Yellow     Appearance Urine (no units)   Date Value   01/25/2023 Clear     Glucose Urine (mg/dL)   Date Value   01/25/2023 Negative     Bilirubin Urine (no units)   Date Value   01/25/2023 Negative     Ketones " Urine (mg/dL)   Date Value   01/25/2023 Negative     Specific Gravity Urine (no units)   Date Value   01/25/2023 1.010     pH Urine (no units)   Date Value   01/25/2023 5.5     Protein Albumin Urine (mg/dL)   Date Value   01/25/2023 Negative     Urobilinogen Urine (E.U./dL)   Date Value   01/25/2023 0.2     Nitrite Urine (no units)   Date Value   01/25/2023 Negative     Leukocyte Esterase Urine (no units)   Date Value   01/25/2023 Negative     Office cystoscopy    Patient was verbally consented for cystoscopy after risks (urethral discomfort, irritative bladder, uti etc) and indications/benefits were discussed    External urethral meatus was cleaned with betadine and lidojet place at urethral meatus for comfort    Cystourethroscopy with a flexible scope performed showed normal bladder and urethral mucosa without evidence of trauma, lesions, or foreign bodies. Bilaterally normally effluxing ureters noted.     Patient tolerated the procedure well.       A/P: Valerie Rachel is a 23 year old F with     Valerie was seen today for cystoscopy.    Diagnoses and all orders for this visit:    Chronic bladder pain  -     lidocaine (XYLOCAINE) 2 % external gel  -     Urine Culture Aerobic Bacterial; Future  -     CYSTOURETHROSCOPY  -     Physical Therapy Referral; Future    Other orders  -     Cl[place]k Urine Macroscopic POCT        Cystoscopy is normal. No evidence of lesions /scarring.   She would like to start with pelvic PT (to work on pelvic floor relaxation) as this has helped in the past.   Referral placed  Follow up as needed

## 2023-04-28 NOTE — PROGRESS NOTES
"Chief Complaint   Patient presents with     Cystoscopy       Blood pressure 129/85, pulse 79, height 1.676 m (5' 6\"), weight 59 kg (130 lb), last menstrual period 2023, not currently breastfeeding. Body mass index is 20.98 kg/m .    Patient Active Problem List   Diagnosis     Acne vulgaris     Chronic bladder pain     Patellar tendonitis     Vaginitis       Allergies   Allergen Reactions     Penicillins Hives and Rash       Current Outpatient Medications   Medication Sig Dispense Refill     cholecalciferol 50 MCG (2000 UT) tablet Take 2,000 Units by mouth       levonorgestrel-ethinyl estradiol (AVIANE) 0.1-20 MG-MCG tablet Take 1 tablet by mouth daily 84 tablet 4       Social History     Tobacco Use     Smoking status: Never     Smokeless tobacco: Never   Substance Use Topics     Alcohol use: Yes     Comment: I drink socially on occasion. 1/w     Drug use: Never       Invasive Procedure Safety Checklist:    Procedure: Cystoscopy    Action: Complete sections and checkboxes as appropriate.    Pre-procedure:  1. Patient ID Verified with 2 identifiers (Maren and  or MRN) : YES    2. Procedure and site verified with patient/designee (when able) : YES    3. Accurate consent documentation in medical record : YES    4. H&P (or appropriate assessment) documented in medical record : N/A  H&P must be up to 30 days prior to procedure an updated within 24 hours of                 Procedure as applicable.     5. Relevant diagnostic and radiology test results appropriately labeled and displayed as applicable : YES    6. Blood products, implants, devices, and/or special equipment available for the procedure as applicable : YES    7. Procedure site(s) marked with provider initials [Exclusions: none] : NO    8. Marking not required. Reason : Yes  Procedure does not require site marking    Time Out:     Time-Out performed immediately prior to starting procedure, including verbal and active participation of all team members " addressing: YES    1. Correct patient identity.  2. Confirmed that the correct side and site are marked.  3. An accurate procedure to be done.  4. Agreement on the procedure to be done.  5. Correct patient position.  6. Relevant images and results are properly labeled and appropriately displayed.  7. The need to administer antibiotics or fluids for irrigation purposes during the procedure as applicable.  8. Safety precautions based on patient history or medication use.    During Procedure: Verification of correct person, site, and procedure occurs any time the responsibility for care of the patient is transferred to another member of the care team.      The following medication was given:     MEDICATION:  Lidocaine without epinephrine 2% jelly  ROUTE: urethral   SITE: urethral   DOSE: 10 mL  LOT #: [id number]  : [employer]  EXPIRATION DATE: 10/2024  NDC#: [address]-2  Was there drug waste? No    Prior to med admin, verified patient identity using patient's name and date of birth.  Due to med administration, patient instructed to remain in clinic for 15 minutes  afterwards, and to report any adverse reaction to me immediately.    Drug Amount Wasted:  None.  Vial/Syringe: Syringe    Afsaneh Ashford  4/28/2023

## 2023-04-30 LAB — BACTERIA UR CULT: NO GROWTH

## 2023-05-08 ENCOUNTER — LAB (OUTPATIENT)
Dept: LAB | Facility: CLINIC | Age: 23
End: 2023-05-08
Payer: COMMERCIAL

## 2023-05-08 ENCOUNTER — TELEPHONE (OUTPATIENT)
Dept: OBGYN | Facility: CLINIC | Age: 23
End: 2023-05-08
Payer: COMMERCIAL

## 2023-05-08 DIAGNOSIS — R39.9 UTI SYMPTOMS: ICD-10-CM

## 2023-05-08 DIAGNOSIS — R39.9 UTI SYMPTOMS: Primary | ICD-10-CM

## 2023-05-08 LAB
ALBUMIN UR-MCNC: NEGATIVE MG/DL
APPEARANCE UR: CLEAR
BILIRUB UR QL STRIP: NEGATIVE
COLOR UR AUTO: NORMAL
GLUCOSE UR STRIP-MCNC: NEGATIVE MG/DL
HGB UR QL STRIP: NEGATIVE
KETONES UR STRIP-MCNC: NEGATIVE MG/DL
LEUKOCYTE ESTERASE UR QL STRIP: NEGATIVE
NITRATE UR QL: NEGATIVE
PH UR STRIP: 6 [PH] (ref 5–7)
RBC URINE: 1 /HPF
SP GR UR STRIP: 1 (ref 1–1.03)
SQUAMOUS EPITHELIAL: <1 /HPF
UROBILINOGEN UR STRIP-MCNC: NORMAL MG/DL
WBC URINE: <1 /HPF

## 2023-05-08 PROCEDURE — 81001 URINALYSIS AUTO W/SCOPE: CPT

## 2023-05-08 NOTE — TELEPHONE ENCOUNTER
Patient called nurse triage line with questions about expected symptoms following cystoscopy. The patient had a cystoscopy on 4/28 with Dr. Nance and had some minor pain following the exam for a few days that has since resolved.     Starting on 5/2 the patient has been experiencing some burning with urination and some minor urgency. Patient also reports dull back pain and pelvic/abdominal discomfort.     Patient denies frequency of urination and fever.    Patient is questioning if she may have developed a UTI but is unsure. Patient reports these symptoms are chronic and somewhat typical for her. Patient requesting Urinary Analysis test. Ordered for patient.    Patient verbalized understanding and will present to the outpatient lab for testing.

## 2023-06-20 ENCOUNTER — THERAPY VISIT (OUTPATIENT)
Dept: PHYSICAL THERAPY | Facility: CLINIC | Age: 23
End: 2023-06-20
Attending: OBSTETRICS & GYNECOLOGY
Payer: COMMERCIAL

## 2023-06-20 DIAGNOSIS — R39.82 CHRONIC BLADDER PAIN: ICD-10-CM

## 2023-06-20 DIAGNOSIS — M62.89 PELVIC FLOOR DYSFUNCTION: ICD-10-CM

## 2023-06-20 PROCEDURE — 97110 THERAPEUTIC EXERCISES: CPT | Mod: GP | Performed by: PHYSICAL THERAPIST

## 2023-06-20 PROCEDURE — 97161 PT EVAL LOW COMPLEX 20 MIN: CPT | Mod: GP | Performed by: PHYSICAL THERAPIST

## 2023-06-20 PROCEDURE — 97530 THERAPEUTIC ACTIVITIES: CPT | Mod: GP | Performed by: PHYSICAL THERAPIST

## 2023-06-20 NOTE — PROGRESS NOTES
PHYSICAL THERAPY EVALUATION  Type of Visit: Evaluation    See electronic medical record for Abuse and Falls Screening details.    Subjective      Presenting condition or subjective complaint: I have been having bladder pain/pain with urination since around 2019. It fluctuates but I would like to resolve the pain!  Date of onset: 04/28/23    Relevant medical history: Bladder or bowel problems   Dates & types of surgery:      Prior diagnostic imaging/testing results: Other I have received a cystosocpy and renal ultrasound for this issue. Both came back normal.   Prior therapy history for the same diagnosis, illness or injury: Yes I received pelvic floor therapy for a month in 2020.    Prior Level of Function   Transfers: Independent  Ambulation: Independent  ADL: Independent    Living Environment  Social support: With a significant other or spouse   Type of home: Apartment/condo   Stairs to enter the home: No       Ramp: No   Stairs inside the home: No       Help at home: None  Equipment owned:       Employment: Yes   Hobbies/Interests: Reading, writing, photography, being outdoors, spending time with friends    She has tried pelvic floor physical therapy in 2020 for 4 visits, but did not continue because she moved. The pt notes that her pain lessened some.     In 2018 she started having burning at the end of urination that sometimes lingered for a few hours after urinating. She was given 3 rounds of antibiotics without any positive bacterial culture. The antibiotics did not help her symptoms.  Initially her pain was consistent and now it comes and goes. If she is stressed or dehydrated her symptoms will flare up. Currently she is feeling pretty good. Her pain is mostly localized to her urethra, but if it is a flare her burning/ aching pain is more diffuse. She does not feel worse when her bladder is full. She denies any urgency or frequency.     Patient goals for therapy: Urinate without pain  and experience daily life without bladder pain.    Pain assessment: Pain denied     Objective      PELVIC EVALUATION  ADDITIONAL HISTORY:  Sex assigned at birth: Female  Gender identity: Female    Pronouns: She/Her Hers      Bladder History:  Feels bladder filling: No  Triggers for feeling of inability to wait to go to the bathroom: No    How long can you wait to urinate: 3-4 hours or more  Gets up at night to urinate: No    Can stop the flow of urine when urinating: Yes  Volume of urine usually released: Average   Other issues:    Number of bladder infections in last 12 months:    Fluid intake per day: At least 32 ounces I don t drink caffeine every day. When I do it s about 16 ounces. I drink alcohol socially about once a week, not every day. When I do, it is around 3 drinks or so, maybe 24 ounces.  Medications taken for bladder: Yes I take Azo occasionally for pain relief   Activities causing urine leak:      Amount of urine typically leaked:    Pads used to help with leaking:          Bowel History:  Frequency of bowel movement: Every other day or every few days  Consistency of stool: Other Typically soft formed but occasionally hard  Ignores the urge to defecate: No  Other bowel issues: Pain when pooping; Straining to have bowel movement  Length of time spent trying to have a bowel movement: I have struggled with constipation my whole life, not every bowel movement is painful or involves straining. It is an occasional issue where I spend no more than 5-10 minutes in the bathroom trying.   She feels as though she empties fully.    Sexual Function History:  Sexual orientation: Straight    Sexually active: Yes  Lubrication used: No No  Pelvic pain:      Pain or difficulty with orgasms/erection/ejaculation: No    State of menopause: Perimenopause (have not gone through menopause yet)  Hormone medications: No      Are you currently pregnant: No, Number of previous pregnancies: 0, Number of deliveries: 0, Have you  been diagnosed with pelvic prolapse or abdominal separation: No, Do you get regular exercise: Yes, I do this type of exercise: Walking, running, stretching, body weight exercises, lifting weights, Have you tried pelvic floor strengthening exercises for 4 weeks: Yes, Do you have any history of trauma that is relevant to your care that you d like to share: No    Discussed reason for referral regarding pelvic health needs and external/internal pelvic floor muscle examination with patient/guardian.  Opportunity provided to ask questions and verbal consent for assessment and intervention was given.    POSTURE: WNL  LUMBAR SCREEN: AROM WNL  HIP SCREEN:  Strength:   Pain: - none + mild ++ moderate +++ severe  Strength Scale: 0-5/5 Left Right   Hip Flexion 5 5   Hip Extension 5- 5-   Hip Abduction 4+ 4+   Hip Internal Rotation 4+ 4+   Hip External Rotation 5- 5-   Knee Flexion 5 5   Knee Extension 5 5     PELVIC/SI SCREEN:  WNL   BREATHING SYMMETRY: WNL    PELVIC EXAM  External Visual Inspection:  At rest: Normal  With voluntary pelvic floor contraction: Perineal elevation  Relaxation of PFM: Yes  With intra-abdominal pressure: Bearing down as defecation: Perineal descent    Integumentary:   Introitus: Unremarkable    External Digital Palpation per Perineum:   Ischiocavernosis: Tightness, Tenderness  Bulbo cavernosis: Tightness, Tenderness  Transverse perineal: Tightness, Tenderness  Levator ani: Unremarkable    Internal Digital Palpation:  Per Vagina:  Tenderness  Tone: normal  Digital Muscle Performance: P (Power): 4/5  E (Endurance): 10 seconds  F (Fast Twitch): 10/10  Compensations: Abdominals  Relaxation Post-Contraction: Normal, Partial/delayed relaxation     Flexibility: tightness of hamstrings bilaterally R>L  Special tests: Ely's test: L: +, R: -    Assessment & Plan   CLINICAL IMPRESSIONS   Medical Diagnosis: pelvic pain    Treatment Diagnosis: pelvic floor dysfunciton   Impression/Assessment: Patient is a 23 year  old female with pain with urination complaints.  The following significant findings have been identified: Pain, Decreased ROM/flexibility, Decreased strength and Impaired muscle performance. These impairments interfere with their ability to perform self care tasks, work tasks and recreational activities as compared to previous level of function.     Clinical Decision Making (Complexity):   Clinical Presentation: Stable/Uncomplicated  Clinical Presentation Rationale: based on medical and personal factors listed in PT evaluation  Clinical Decision Making (Complexity): Low complexity    PLAN OF CARE  Treatment Interventions:  Modalities: Biofeedback, Dry Needling, E-stim  Interventions: Manual Therapy, Neuromuscular Re-education, Therapeutic Activity, Therapeutic Exercise, Self-Care/Home Management    Long Term Goals     PT Goal 1  Goal Description: Pt will have no greater than 0/10 pain with voiding.  Rationale: to maximize safety and independence with performance of ADLs and functional tasks  Target Date: 09/13/23      Frequency of Treatment: 2x a month  Duration of Treatment: 3 months    Education Assessment:   Learner/Method: Patient    Risks and benefits of evaluation/treatment have been explained.   Patient/Family/caregiver agrees with Plan of Care.     Evaluation Time:         Signing Clinician: Claudia Burrows, PT

## 2023-06-30 ENCOUNTER — THERAPY VISIT (OUTPATIENT)
Dept: PHYSICAL THERAPY | Facility: CLINIC | Age: 23
End: 2023-06-30
Payer: COMMERCIAL

## 2023-06-30 DIAGNOSIS — M62.89 PELVIC FLOOR DYSFUNCTION: Primary | ICD-10-CM

## 2023-06-30 PROCEDURE — 97110 THERAPEUTIC EXERCISES: CPT | Mod: GP | Performed by: PHYSICAL THERAPIST

## 2023-06-30 PROCEDURE — 97530 THERAPEUTIC ACTIVITIES: CPT | Mod: GP | Performed by: PHYSICAL THERAPIST

## 2023-07-20 ASSESSMENT — ENCOUNTER SYMPTOMS
FLANK PAIN: 0
DEPRESSION: 1
CHILLS: 0
BREAST MASS: 0
HOT FLASHES: 0
ABDOMINAL PAIN: 0
WEAKNESS: 0
JOINT SWELLING: 0
DIZZINESS: 0
MYALGIAS: 0
NERVOUS/ANXIOUS: 1
FREQUENCY: 0
HEMATOCHEZIA: 0
HEARTBURN: 0
DIFFICULTY URINATING: 0
PALPITATIONS: 0
DECREASED LIBIDO: 0
HEMATURIA: 0
COUGH: 0
SORE THROAT: 0
PANIC: 0
DYSURIA: 1
INSOMNIA: 0
SHORTNESS OF BREATH: 0
EYE PAIN: 0
PARESTHESIAS: 0
DIARRHEA: 0
NAUSEA: 1
HEADACHES: 1
DECREASED CONCENTRATION: 1
FEVER: 0
ARTHRALGIAS: 0
CONSTIPATION: 0

## 2023-07-20 ASSESSMENT — ANXIETY QUESTIONNAIRES
5. BEING SO RESTLESS THAT IT IS HARD TO SIT STILL: NOT AT ALL
GAD7 TOTAL SCORE: 4
6. BECOMING EASILY ANNOYED OR IRRITABLE: SEVERAL DAYS
7. FEELING AFRAID AS IF SOMETHING AWFUL MIGHT HAPPEN: NOT AT ALL
3. WORRYING TOO MUCH ABOUT DIFFERENT THINGS: SEVERAL DAYS
IF YOU CHECKED OFF ANY PROBLEMS ON THIS QUESTIONNAIRE, HOW DIFFICULT HAVE THESE PROBLEMS MADE IT FOR YOU TO DO YOUR WORK, TAKE CARE OF THINGS AT HOME, OR GET ALONG WITH OTHER PEOPLE: SOMEWHAT DIFFICULT
4. TROUBLE RELAXING: SEVERAL DAYS
GAD7 TOTAL SCORE: 4
1. FEELING NERVOUS, ANXIOUS, OR ON EDGE: SEVERAL DAYS
2. NOT BEING ABLE TO STOP OR CONTROL WORRYING: NOT AT ALL

## 2023-07-25 ENCOUNTER — THERAPY VISIT (OUTPATIENT)
Dept: PHYSICAL THERAPY | Facility: CLINIC | Age: 23
End: 2023-07-25
Payer: COMMERCIAL

## 2023-07-25 DIAGNOSIS — M62.89 PELVIC FLOOR DYSFUNCTION: Primary | ICD-10-CM

## 2023-07-25 PROCEDURE — 97140 MANUAL THERAPY 1/> REGIONS: CPT | Mod: GP | Performed by: PHYSICAL THERAPIST

## 2023-07-25 PROCEDURE — 97110 THERAPEUTIC EXERCISES: CPT | Mod: GP | Performed by: PHYSICAL THERAPIST

## 2023-07-27 ENCOUNTER — OFFICE VISIT (OUTPATIENT)
Dept: OBGYN | Facility: CLINIC | Age: 23
End: 2023-07-27
Attending: NURSE PRACTITIONER
Payer: COMMERCIAL

## 2023-07-27 ENCOUNTER — APPOINTMENT (OUTPATIENT)
Dept: LAB | Facility: CLINIC | Age: 23
End: 2023-07-27
Attending: NURSE PRACTITIONER
Payer: COMMERCIAL

## 2023-07-27 VITALS
WEIGHT: 144.6 LBS | HEART RATE: 67 BPM | BODY MASS INDEX: 23.24 KG/M2 | DIASTOLIC BLOOD PRESSURE: 84 MMHG | SYSTOLIC BLOOD PRESSURE: 129 MMHG | HEIGHT: 66 IN

## 2023-07-27 DIAGNOSIS — Z30.011 ORAL CONTRACEPTION INITIAL PRESCRIPTION: ICD-10-CM

## 2023-07-27 DIAGNOSIS — Z13.220 SCREENING FOR LIPID DISORDERS: ICD-10-CM

## 2023-07-27 DIAGNOSIS — Z00.00 VISIT FOR PREVENTIVE HEALTH EXAMINATION: Primary | ICD-10-CM

## 2023-07-27 LAB
CHOLEST SERPL-MCNC: 233 MG/DL
HDLC SERPL-MCNC: 72 MG/DL
LDLC SERPL CALC-MCNC: 143 MG/DL
NONHDLC SERPL-MCNC: 161 MG/DL
TRIGL SERPL-MCNC: 88 MG/DL

## 2023-07-27 PROCEDURE — 80061 LIPID PANEL: CPT | Performed by: NURSE PRACTITIONER

## 2023-07-27 PROCEDURE — 36415 COLL VENOUS BLD VENIPUNCTURE: CPT | Performed by: NURSE PRACTITIONER

## 2023-07-27 PROCEDURE — 99395 PREV VISIT EST AGE 18-39: CPT | Performed by: NURSE PRACTITIONER

## 2023-07-27 PROCEDURE — G0463 HOSPITAL OUTPT CLINIC VISIT: HCPCS | Performed by: NURSE PRACTITIONER

## 2023-07-27 RX ORDER — LEVONORGESTREL/ETHIN.ESTRADIOL 0.1-0.02MG
1 TABLET ORAL DAILY
Qty: 112 TABLET | Refills: 4 | Status: SHIPPED | OUTPATIENT
Start: 2023-07-27 | End: 2024-07-11

## 2023-07-27 RX ORDER — LEVONORGESTREL/ETHIN.ESTRADIOL 0.1-0.02MG
1 TABLET ORAL DAILY
Qty: 84 TABLET | Refills: 4 | Status: SHIPPED | OUTPATIENT
Start: 2023-07-27 | End: 2023-07-27

## 2023-07-27 NOTE — LETTER
2023       RE: Valerie Rachel  [address]  [facility] [address]     Dear Colleague,    Thank you for referring your patient, Valerie Rachel, to the [facility] at [facility]. Please see a copy of my visit note below.      Progress Note    SUBJECTIVE:  Valerie Rachel is an 23 year old, , who requests an Annual Preventive Exam.     Concerns today include:   1). Contraception- On COCs. Experiencing headache and nausea the first week she starts her active pills after her placebo week. This has been going since she started COCs; she has switched to several different pill types to try to relieve this but no difference was made. Currently on 20mcg pill.  She usually does not have headaches and they seem to correlate with her COCs. Wondering what her options are.     2). Chronic bladder pain- She had a cystoscopy procedure done 2023. Results were unremarkable. She was referred to PT. She is noticing improvement in her symptoms after starting PT.     Sexual Hx. Declined STD testing. No sexual concerns. 1 male panter in the last 2 years.     Mental Health- Anxiety and depression diagnosed. Well managed with therapy every other week.     Diet/exercise- Overall eats health and gets adequate amount of PE     Pap Smear- normal pap smear ; due 2024.   History of abnormal Pap smear: NO - age 21-29 PAP every 3 years recommended    Immunizations- due for TDAP per records. Stated she moved recently and will look back in her records to see her last immunization.      Preventive labs- Elevated lipid levels (2022)- will repeat lab. No family Hx of cardiovascular events or hyperlipoidemia.      Answers for HPI/ROS submitted by the patient on 2023  LEXUS 7 TOTAL SCORE: 4  Frequency of exercise:: 2-3 days/week  Getting at least 3 servings of Calcium per day:: Yes  Diet:: Regular (no restrictions)  Taking medications regularly::  Yes  Medication side effects:: None  Bi-annual eye exam:: Yes  Dental care twice a year:: Yes  Sleep apnea or symptoms of sleep apnea:: None  abdominal pain: No  Blood in stool: No  chest pain: No  chills: No  congestion: No  constipation: No  cough: No  diarrhea: No  dizziness: No  ear pain: No  eye pain: No  fever: No  frequency: No  headaches: Yes  hearing loss: No  heartburn: No  arthralgias: No  joint swelling: No  peripheral edema: No  mood changes: No  myalgias: No  nausea: Yes  palpitations: No  Skin sensation changes: No  sore throat: No  rash: No  shortness of breath: No  visual disturbance: No  weakness: No  pelvic pain: No  vaginal bleeding: No  tenderness: No  breast mass: No  breast discharge: No  Additional concerns today:: No  Duration of exercise:: 15-30 minutes  General Symptoms: No  Skin Symptoms: No  HENT Symptoms: No  EYE SYMPTOMS: No  HEART SYMPTOMS: No  LUNG SYMPTOMS: No  INTESTINAL SYMPTOMS: No  URINARY SYMPTOMS: Yes  GYNECOLOGIC SYMPTOMS: Yes  BREAST SYMPTOMS: No  SKELETAL SYMPTOMS: No  BLOOD SYMPTOMS: No  NERVOUS SYSTEM SYMPTOMS: No  MENTAL HEALTH SYMPTOMS: Yes  Blood in urine: No  dysuria: Yes  urgency: No  Trouble holding urine or incontinence: No  Increased frequency of urination: No  Decreased frequency of urination: No  Frequent nighttime urination: No  Flank pain: No  Difficulty emptying bladder: No  genital sores: No  Bleeding or spotting between periods: No  Heavy or painful periods: No  Irregular periods: No  Hot flashes: No  Vaginal dryness: No  Reduced libido: No  Painful intercourse: No  Difficulty with sexual arousal: No  Post-menopausal bleeding: No  Depression: Yes  Trouble sleeping: No  Trouble thinking or concentrating: Yes  Mood changes: No  Panic attacks: No    Conflicting answers have been found for some questions. Please document the patient's answers manually.   Menstrual History:      5/28/2021     3:00 PM 3/7/2023     1:30 PM 7/27/2023     1:00 PM   Menstrual History    LAST MENSTRUAL PERIOD 3/17/2021 2023 2023     Mammogram current: not applicable  Last Colonoscopy: n/a        HISTORY:  cholecalciferol 50 MCG (2000 UT) tablet, Take 2,000 Units by mouth    No current facility-administered medications on file prior to visit.    Allergies   Allergen Reactions    Penicillins Hives and Rash     Immunization History   Administered Date(s) Administered    COVID-19 MONOVALENT 12+ (Pfizer) 2021, 2021, 2021    DTAP (<7y) 1998, 1998, 1998, 1999, 2003    HPV Quadrivalent 2013, 2013, 2013    HepB, Unspecified 1998, 1998, 1999    Hib, Unspecified 1998, 1998, 1999    Influenza Vaccine >6 months (Alfuria,Fluzone) 2014    Influenza, Whole Virus 2018    MMR 1999, 2003    Meningococcal (Menomune ) 2010    Meningococcal ACWY (Menactra ) 07/15/2015    Poliovirus, inactivated (IPV) 1998, 1998, 1999, 2003    TDAP (Adacel,Boostrix) 2009, 06/15/2020    Varicella 10/22/1999, 2007       OB History    Para Term  AB Living   0 0 0 0 0 0   SAB IAB Ectopic Multiple Live Births   0 0 0 0 0     Past Medical History:   Diagnosis Date    Anxiety     Patellar tendonitis     Recurrent yeast vulvovaginitis      Past Surgical History:   Procedure Laterality Date    NO HISTORY OF SURGERY       No family history on file.  Social History     Socioeconomic History    Marital status: Single     Spouse name: None    Number of children: None    Years of education: None    Highest education level: None   Tobacco Use    Smoking status: Never    Smokeless tobacco: Never   Substance and Sexual Activity    Alcohol use: Yes     Comment: I drink socially on occasion. 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        5/10/2021     2:25 PM 8/11/2021     1:51 PM 6/7/2022     4:04 PM   PHQ-9 SCORE   PHQ-9 Total Score 4 4 4   PHQ-2 Score:         7/26/2023     1:42 PM 3/6/2023    11:18 AM   PHQ-2 ( 1999 Pfizer)   Q1: Little interest or pleasure in doing things 0 0   Q2: Feeling down, depressed or hopeless 0 1   PHQ-2 Score 0 1   Q1: Little interest or pleasure in doing things Not at all Not at all   Q2: Feeling down, depressed or hopeless Not at all Several days   PHQ-2 Score 0 1           8/11/2021     1:51 PM 6/7/2022    10:59 AM 7/20/2023    12:06 PM   LEXUS-7 SCORE   Total Score  5 (mild anxiety) 4 (minimal anxiety)   Total Score 4 5 4       EXAM:  Blood pressure 129/84, pulse 67, height 1.676 m (5' 6\"), weight 65.6 kg (144 lb 9.6 oz), last menstrual period 07/05/2023, not currently breastfeeding. Body mass index is 23.34 kg/m .  General - pleasant female in no acute distress.  Skin - no suspicious lesions or rashes  EENT-   euthyroid with out palpable nodules  Neck - supple without lymphadenopathy.  Lungs - clear to auscultation bilaterally.  Heart - regular rate and rhythm without murmur.  Abdomen - soft, nontender, nondistended, no masses or organomegaly noted.  Musculoskeletal - no gross deformities.  Neurological - normal strength, sensation, and mental status.    Breast Exam:  Breast: Without visible skin changes. No dimpling or lesions seen.   Breasts supple, non-tender with palpation, no dominant mass, nodularity, or nipple discharge noted bilaterally. Axillary nodes negative.  "     Assessment/Plan  1. Oral contraception initial prescription  - levonorgestrel-ethinyl estradiol (AVIANE) 0.1-20 MG-MCG tablet; Take 1 tablet by mouth daily  Dispense: 112 tablet; Refill: 4  -Discussed having the option of continuously cycling her COCs instead of having a placebo week to see if that prevents her headaches. Alternatively, offered to switch patient to progesterone only or non-hormonal contraception which includes POP, depo provera, implant, or IUD. Discussed possible side effects. Pt deciding to start continuously cycling and see if this helps her headache. Recommended she monitor her headaches and notify provider if they become more frequent or severe/ intense. Will reach out if wanting to switch.     2. Visit for preventive health examination  - Lipid Profile  - pap smear 2024    3. Screening for lipid disorders  - Lipid Profile    Additional teaching done at this visit regarding self breast awareness and mental health.    Return to clinic in one year.  Follow-up as needed.    Nena Varghese, DNP, APRN, WHNP

## 2023-07-27 NOTE — PATIENT INSTRUCTIONS
Thank you for trusting us with your care!     If you need to contact us for questions about:  Symptoms, Scheduling & Medical Questions; Non-urgent (2-3 day response) Carmelo message, Urgent (needing response today) [phone] (if after 3:30pm next day response)   Prescriptions: Please call your Pharmacy   Billing: [facility] [phone] or [facility]:[phone]

## 2023-07-27 NOTE — PROGRESS NOTES
Progress Note    SUBJECTIVE:  Valerie Rachel is an 23 year old, , who requests an Annual Preventive Exam.     Concerns today include:   1). Contraception- On COCs. Experiencing headache and nausea the first week she starts her active pills after her placebo week. This has been going since she started COCs; she has switched to several different pill types to try to relieve this but no difference was made. Currently on 20mcg pill.  She usually does not have headaches and they seem to correlate with her COCs. Wondering what her options are.     2). Chronic bladder pain- She had a cystoscopy procedure done 2023. Results were unremarkable. She was referred to PT. She is noticing improvement in her symptoms after starting PT.     Sexual Hx. Declined STD testing. No sexual concerns. 1 male panter in the last 2 years.     Mental Health- Anxiety and depression diagnosed. Well managed with therapy every other week.     Diet/exercise- Overall eats health and gets adequate amount of PE     Pap Smear- normal pap smear ; due 2024.   History of abnormal Pap smear: NO - age 21-29 PAP every 3 years recommended    Immunizations- due for TDAP per records. Stated she moved recently and will look back in her records to see her last immunization.      Preventive labs- Elevated lipid levels (2022)- will repeat lab. No family Hx of cardiovascular events or hyperlipoidemia.      Answers for HPI/ROS submitted by the patient on 2023  LEXUS 7 TOTAL SCORE: 4  Frequency of exercise:: 2-3 days/week  Getting at least 3 servings of Calcium per day:: Yes  Diet:: Regular (no restrictions)  Taking medications regularly:: Yes  Medication side effects:: None  Bi-annual eye exam:: Yes  Dental care twice a year:: Yes  Sleep apnea or symptoms of sleep apnea:: None  abdominal pain: No  Blood in stool: No  chest pain: No  chills: No  congestion: No  constipation: No  cough: No  diarrhea: No  dizziness: No  ear pain: No  eye pain:  No  fever: No  frequency: No  headaches: Yes  hearing loss: No  heartburn: No  arthralgias: No  joint swelling: No  peripheral edema: No  mood changes: No  myalgias: No  nausea: Yes  palpitations: No  Skin sensation changes: No  sore throat: No  rash: No  shortness of breath: No  visual disturbance: No  weakness: No  pelvic pain: No  vaginal bleeding: No  tenderness: No  breast mass: No  breast discharge: No  Additional concerns today:: No  Duration of exercise:: 15-30 minutes  General Symptoms: No  Skin Symptoms: No  HENT Symptoms: No  EYE SYMPTOMS: No  HEART SYMPTOMS: No  LUNG SYMPTOMS: No  INTESTINAL SYMPTOMS: No  URINARY SYMPTOMS: Yes  GYNECOLOGIC SYMPTOMS: Yes  BREAST SYMPTOMS: No  SKELETAL SYMPTOMS: No  BLOOD SYMPTOMS: No  NERVOUS SYSTEM SYMPTOMS: No  MENTAL HEALTH SYMPTOMS: Yes  Blood in urine: No  dysuria: Yes  urgency: No  Trouble holding urine or incontinence: No  Increased frequency of urination: No  Decreased frequency of urination: No  Frequent nighttime urination: No  Flank pain: No  Difficulty emptying bladder: No  genital sores: No  Bleeding or spotting between periods: No  Heavy or painful periods: No  Irregular periods: No  Hot flashes: No  Vaginal dryness: No  Reduced libido: No  Painful intercourse: No  Difficulty with sexual arousal: No  Post-menopausal bleeding: No  Depression: Yes  Trouble sleeping: No  Trouble thinking or concentrating: Yes  Mood changes: No  Panic attacks: No    Conflicting answers have been found for some questions. Please document the patient's answers manually.   Menstrual History:      5/28/2021     3:00 PM 3/7/2023     1:30 PM 7/27/2023     1:00 PM   Menstrual History   LAST MENSTRUAL PERIOD 3/17/2021 2/21/2023 7/5/2023     Mammogram current: not applicable  Last Colonoscopy: n/a        HISTORY:  cholecalciferol 50 MCG (2000 UT) tablet, Take 2,000 Units by mouth    No current facility-administered medications on file prior to visit.    Allergies   Allergen Reactions     Penicillins Hives and Rash     Immunization History   Administered Date(s) Administered    COVID-19 MONOVALENT 12+ (Pfizer) 2021, 2021, 2021    DTAP (<7y) 1998, 1998, 1998, 1999, 2003    HPV Quadrivalent 2013, 2013, 2013    HepB, Unspecified 1998, 1998, 1999    Hib, Unspecified 1998, 1998, 1999    Influenza Vaccine >6 months (Alfuria,Fluzone) 2014    Influenza, Whole Virus 2018    MMR 1999, 2003    Meningococcal (Menomune ) 2010    Meningococcal ACWY (Menactra ) 07/15/2015    Poliovirus, inactivated (IPV) 1998, 1998, 1999, 2003    TDAP (Adacel,Boostrix) 2009, 06/15/2020    Varicella 10/22/1999, 2007       OB History    Para Term  AB Living   0 0 0 0 0 0   SAB IAB Ectopic Multiple Live Births   0 0 0 0 0     Past Medical History:   Diagnosis Date    Anxiety     Patellar tendonitis     Recurrent yeast vulvovaginitis      Past Surgical History:   Procedure Laterality Date    NO HISTORY OF SURGERY       No family history on file.  Social History     Socioeconomic History    Marital status: Single     Spouse name: None    Number of children: None    Years of education: None    Highest education level: None   Tobacco Use    Smoking status: Never    Smokeless tobacco: Never   Substance and Sexual Activity    Alcohol use: Yes     Comment: I drink socially on occasion. 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        5/10/2021     2:25 PM 8/11/2021     1:51 PM 6/7/2022     4:04 PM   PHQ-9 SCORE   PHQ-9 Total Score 4 4 4   PHQ-2 Score:         7/26/2023     1:42 PM 3/6/2023    11:18 AM   PHQ-2 ( 1999 Pfizer)   Q1: Little interest or pleasure in doing things 0 0   Q2: Feeling down, depressed or hopeless 0 1   PHQ-2 Score 0 1   Q1: Little interest or pleasure in doing things Not at all Not at all   Q2: Feeling down, depressed or hopeless Not at all Several days   PHQ-2 Score 0 1           8/11/2021     1:51 PM 6/7/2022    10:59 AM 7/20/2023    12:06 PM   LEXUS-7 SCORE   Total Score  5 (mild anxiety) 4 (minimal anxiety)   Total Score 4 5 4       EXAM:  Blood pressure 129/84, pulse 67, height 1.676 m (5' 6\"), weight 65.6 kg (144 lb 9.6 oz), last menstrual period 07/05/2023, not currently breastfeeding. Body mass index is 23.34 kg/m .  General - pleasant female in no acute distress.  Skin - no suspicious lesions or rashes  EENT-   euthyroid with out palpable nodules  Neck - supple without lymphadenopathy.  Lungs - clear to auscultation bilaterally.  Heart - regular rate and rhythm without murmur.  Abdomen - soft, nontender, nondistended, no masses or organomegaly noted.  Musculoskeletal - no gross deformities.  Neurological - normal strength, sensation, and mental status.    Breast Exam:  Breast: Without visible skin changes. No dimpling or lesions seen.   Breasts supple, non-tender with palpation, no dominant mass, nodularity, or nipple discharge noted bilaterally. Axillary nodes negative.      Assessment/Plan  1. Oral contraception initial prescription  - levonorgestrel-ethinyl estradiol (AVIANE) 0.1-20 MG-MCG tablet; Take 1 tablet by mouth daily  Dispense: 112 tablet; Refill: 4  -Discussed having the option of continuously cycling her COCs instead of having a placebo week to see if that " prevents her headaches. Alternatively, offered to switch patient to progesterone only or non-hormonal contraception which includes POP, depo provera, implant, or IUD. Discussed possible side effects. Pt deciding to start continuously cycling and see if this helps her headache. Recommended she monitor her headaches and notify provider if they become more frequent or severe/ intense. Will reach out if wanting to switch.     2. Visit for preventive health examination  - Lipid Profile  - pap smear 2024    3. Screening for lipid disorders  - Lipid Profile    Additional teaching done at this visit regarding self breast awareness and mental health.    Return to clinic in one year.  Follow-up as needed.    Nena Varghese, DNP, APRN, WHNP

## 2023-08-02 ENCOUNTER — THERAPY VISIT (OUTPATIENT)
Dept: PHYSICAL THERAPY | Facility: CLINIC | Age: 23
End: 2023-08-02
Payer: COMMERCIAL

## 2023-08-02 ENCOUNTER — MYC MEDICAL ADVICE (OUTPATIENT)
Dept: OBGYN | Facility: CLINIC | Age: 23
End: 2023-08-02

## 2023-08-02 DIAGNOSIS — Z30.011 ORAL CONTRACEPTION INITIAL PRESCRIPTION: Primary | ICD-10-CM

## 2023-08-02 DIAGNOSIS — M62.89 PELVIC FLOOR DYSFUNCTION: Primary | ICD-10-CM

## 2023-08-02 PROCEDURE — 97110 THERAPEUTIC EXERCISES: CPT | Mod: GP | Performed by: PHYSICAL THERAPIST

## 2023-08-02 PROCEDURE — 97530 THERAPEUTIC ACTIVITIES: CPT | Mod: GP | Performed by: PHYSICAL THERAPIST

## 2023-08-03 RX ORDER — ACETAMINOPHEN AND CODEINE PHOSPHATE 120; 12 MG/5ML; MG/5ML
0.35 SOLUTION ORAL DAILY
Qty: 84 TABLET | Refills: 3 | Status: SHIPPED | OUTPATIENT
Start: 2023-08-03 | End: 2024-07-11

## 2023-08-05 ENCOUNTER — HEALTH MAINTENANCE LETTER (OUTPATIENT)
Age: 23
End: 2023-08-05

## 2023-08-23 ENCOUNTER — THERAPY VISIT (OUTPATIENT)
Dept: PHYSICAL THERAPY | Facility: CLINIC | Age: 23
End: 2023-08-23
Payer: COMMERCIAL

## 2023-08-23 DIAGNOSIS — M62.89 PELVIC FLOOR DYSFUNCTION: Primary | ICD-10-CM

## 2023-08-23 PROCEDURE — 97140 MANUAL THERAPY 1/> REGIONS: CPT | Mod: GP | Performed by: PHYSICAL THERAPIST

## 2023-08-23 PROCEDURE — 97110 THERAPEUTIC EXERCISES: CPT | Mod: GP | Performed by: PHYSICAL THERAPIST

## 2023-08-23 PROCEDURE — 97530 THERAPEUTIC ACTIVITIES: CPT | Mod: GP | Performed by: PHYSICAL THERAPIST

## 2023-09-26 ENCOUNTER — THERAPY VISIT (OUTPATIENT)
Dept: PHYSICAL THERAPY | Facility: CLINIC | Age: 23
End: 2023-09-26
Payer: COMMERCIAL

## 2023-09-26 DIAGNOSIS — M62.89 PELVIC FLOOR DYSFUNCTION: Primary | ICD-10-CM

## 2023-09-26 PROCEDURE — 97530 THERAPEUTIC ACTIVITIES: CPT | Mod: GP | Performed by: PHYSICAL THERAPIST

## 2023-09-26 PROCEDURE — 97110 THERAPEUTIC EXERCISES: CPT | Mod: GP | Performed by: PHYSICAL THERAPIST

## 2023-10-11 ENCOUNTER — THERAPY VISIT (OUTPATIENT)
Dept: PHYSICAL THERAPY | Facility: CLINIC | Age: 23
End: 2023-10-11
Payer: COMMERCIAL

## 2023-10-11 DIAGNOSIS — M62.89 PELVIC FLOOR DYSFUNCTION: Primary | ICD-10-CM

## 2023-10-11 PROCEDURE — 97530 THERAPEUTIC ACTIVITIES: CPT | Mod: GP | Performed by: PHYSICAL THERAPIST

## 2023-10-11 PROCEDURE — 97110 THERAPEUTIC EXERCISES: CPT | Mod: GP | Performed by: PHYSICAL THERAPIST

## 2023-10-16 ENCOUNTER — MYC MEDICAL ADVICE (OUTPATIENT)
Dept: OBGYN | Facility: CLINIC | Age: 23
End: 2023-10-16
Payer: COMMERCIAL

## 2023-10-17 DIAGNOSIS — N94.819 VULVODYNIA: Primary | ICD-10-CM

## 2023-10-17 RX ORDER — LIDOCAINE 50 MG/G
OINTMENT TOPICAL
Qty: 50 G | Refills: 1 | Status: SHIPPED | OUTPATIENT
Start: 2023-10-17 | End: 2024-07-11

## 2023-10-25 ENCOUNTER — THERAPY VISIT (OUTPATIENT)
Dept: PHYSICAL THERAPY | Facility: CLINIC | Age: 23
End: 2023-10-25
Payer: COMMERCIAL

## 2023-10-25 DIAGNOSIS — M62.89 PELVIC FLOOR DYSFUNCTION: Primary | ICD-10-CM

## 2023-10-25 PROCEDURE — 97140 MANUAL THERAPY 1/> REGIONS: CPT | Mod: GP | Performed by: PHYSICAL THERAPIST

## 2023-10-25 PROCEDURE — 97530 THERAPEUTIC ACTIVITIES: CPT | Mod: GP | Performed by: PHYSICAL THERAPIST

## 2024-07-11 ENCOUNTER — LAB (OUTPATIENT)
Dept: LAB | Facility: CLINIC | Age: 24
End: 2024-07-11
Attending: NURSE PRACTITIONER
Payer: COMMERCIAL

## 2024-07-11 ENCOUNTER — OFFICE VISIT (OUTPATIENT)
Dept: OBGYN | Facility: CLINIC | Age: 24
End: 2024-07-11
Attending: NURSE PRACTITIONER
Payer: COMMERCIAL

## 2024-07-11 VITALS
SYSTOLIC BLOOD PRESSURE: 118 MMHG | HEIGHT: 67 IN | HEART RATE: 60 BPM | DIASTOLIC BLOOD PRESSURE: 72 MMHG | BODY MASS INDEX: 22.91 KG/M2 | WEIGHT: 146 LBS

## 2024-07-11 DIAGNOSIS — Z13.220 SCREENING FOR LIPID DISORDERS: ICD-10-CM

## 2024-07-11 DIAGNOSIS — Z00.00 VISIT FOR PREVENTIVE HEALTH EXAMINATION: Primary | ICD-10-CM

## 2024-07-11 DIAGNOSIS — Z12.4 SCREENING FOR CERVICAL CANCER: ICD-10-CM

## 2024-07-11 DIAGNOSIS — Z11.3 SCREEN FOR STD (SEXUALLY TRANSMITTED DISEASE): ICD-10-CM

## 2024-07-11 DIAGNOSIS — Z00.00 VISIT FOR PREVENTIVE HEALTH EXAMINATION: ICD-10-CM

## 2024-07-11 LAB
CHOLEST SERPL-MCNC: 211 MG/DL
ERYTHROCYTE [DISTWIDTH] IN BLOOD BY AUTOMATED COUNT: 12.3 % (ref 10–15)
FASTING STATUS PATIENT QL REPORTED: NO
HCT VFR BLD AUTO: 39.4 % (ref 35–47)
HDLC SERPL-MCNC: 73 MG/DL
HGB BLD-MCNC: 13.5 G/DL (ref 11.7–15.7)
LDLC SERPL CALC-MCNC: 125 MG/DL
MCH RBC QN AUTO: 30.7 PG (ref 26.5–33)
MCHC RBC AUTO-ENTMCNC: 34.3 G/DL (ref 31.5–36.5)
MCV RBC AUTO: 90 FL (ref 78–100)
NONHDLC SERPL-MCNC: 138 MG/DL
PLATELET # BLD AUTO: 200 10E3/UL (ref 150–450)
RBC # BLD AUTO: 4.4 10E6/UL (ref 3.8–5.2)
TRIGL SERPL-MCNC: 66 MG/DL
WBC # BLD AUTO: 4.8 10E3/UL (ref 4–11)

## 2024-07-11 PROCEDURE — 83718 ASSAY OF LIPOPROTEIN: CPT

## 2024-07-11 PROCEDURE — 87491 CHLMYD TRACH DNA AMP PROBE: CPT | Performed by: NURSE PRACTITIONER

## 2024-07-11 PROCEDURE — 82465 ASSAY BLD/SERUM CHOLESTEROL: CPT

## 2024-07-11 PROCEDURE — 36415 COLL VENOUS BLD VENIPUNCTURE: CPT

## 2024-07-11 PROCEDURE — G0145 SCR C/V CYTO,THINLAYER,RESCR: HCPCS | Performed by: NURSE PRACTITIONER

## 2024-07-11 PROCEDURE — 99213 OFFICE O/P EST LOW 20 MIN: CPT | Performed by: NURSE PRACTITIONER

## 2024-07-11 PROCEDURE — 85027 COMPLETE CBC AUTOMATED: CPT

## 2024-07-11 PROCEDURE — 99395 PREV VISIT EST AGE 18-39: CPT | Performed by: NURSE PRACTITIONER

## 2024-07-11 PROCEDURE — 87591 N.GONORRHOEAE DNA AMP PROB: CPT | Performed by: NURSE PRACTITIONER

## 2024-07-11 ASSESSMENT — PAIN SCALES - GENERAL: PAINLEVEL: NO PAIN (0)

## 2024-07-11 NOTE — LETTER
"2024       RE: Valerie Rachel  [address]  [facility] [address]     Dear Colleague,    Thank you for referring your patient, Valerie Rachel, to the [facility] [place] at [facility]. Please see a copy of my visit note below.    Progress Note    SUBJECTIVE:  Valerie Rachel is an 24 year old, , who requests an Annual Preventive Exam.     Concerns today include: menstrual cycle changes after discontinuing hormonal birth control    Berta did not like the side effects of COCs or POPs and discontinued their use in 2023. She has been happy to get to know her body's cycles. She reports that her menses come monthly, last 4-5 days and are associated with dizziness and headaches. She had anemia that was treated with iron at 11 y.o. Ibuprofen and hydration help somewhat. She also has cramping and bloating for a week around the time of her ovulation. She occasionally has noted mid-cycle unilateral sharp pain in her abdomen that resolves on its own. These symptoms are not bothersome, she is seeking reassurance that this is a normal cycle and is wondering if she needs hormonal levels drawn.     She has a history of chronic bladder pain that is tolerable at this time. She has pain with voiding a few times per week. She saw pelvic floor physical therapy, but felt that her benefit plateau-ed eventually. She follows with Dr. Nance.     Sexual Hx:  No sexual concerns  1 monogamous sexual partner   Open to STI screening  - Contraception: condoms      Mental Health:   Anxiety and depression diagnosed. Well managed with therapy every other week.   Interested in medication at some point, but not interested today d/t her upcoming move     Social:  Moving to [facility] in August with her boyfriend. 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      Diet-   Trying to incorporate more fruits and veggies   \"Snack plate\" at " lunch, fruit, yogurt, cheese, hummus, carrots   Dinner: cooking at home, vegetable     Exercise:   Goes on walks on treadmill or outside, weight lifting   3-4 times a week at least      Immunizations- due for TDAP per records. Stated she moved recently and will look back in her records to see her last immunization.     Preventive labs- Elevated lipid levels (2022 and 2023)- No family Hx of cardiovascular events or hyperlipidemia.     Menstrual History:      3/7/2023     1:30 PM 2023     1:00 PM 2024     1:00 PM   Menstrual History   LAST MENSTRUAL PERIOD 2023       Last    Lab Results   Component Value Date    PAP NIL 2021     History of abnormal Pap smear: No - age 21-29 PAP every 3 years recommended ---> DUE    Mammogram current: not applicable    Last Colonoscopy:/a    HISTORY:  Current Outpatient Medications   Medication Sig Dispense Refill     cholecalciferol 50 MCG ( UT) tablet Take 2,000 Units by mouth       No current facility-administered medications for this visit.     Allergies   Allergen Reactions     Penicillins Hives and Rash     Immunization History   Administered Date(s) Administered     COVID-19 MONOVALENT 12+ (Pfizer) 2021, 2021, 2021     DTAP (<7y) 1998, 1998, 1998, 1999, 2003     HIB, Unspecified 1998, 1998, 1999     HPV Quadrivalent 2013, 2013, 2013     HepB, Unspecified 1998, 1998, 1999     Influenza Vaccine >6 months,quad, PF 2014     Influenza, Whole Virus 2018     MMR 1999, 2003     Meningococcal (Menomune ) 2010     Meningococcal ACWY (Menactra ) 07/15/2015     Poliovirus, inactivated (IPV) 1998, 1998, 1999, 2003     TDAP (Adacel,Boostrix) 2009, 06/15/2020     Varicella 10/22/1999, 2007       OB History    Para Term  AB Living   0 0 0 0 0 0   SAB IAB Ectopic  Multiple Live Births   0 0 0 0 0     Past Medical History:   Diagnosis Date     Anxiety      Patellar tendonitis      Recurrent yeast vulvovaginitis      Past Surgical History:   Procedure Laterality Date     NO HISTORY OF SURGERY       WISDOM TOOTH EXTRACTION Bilateral      History reviewed. No pertinent family history.  Social History     Socioeconomic History     Marital status: Single   Tobacco Use     Smoking status: Never     Smokeless tobacco: Never   Substance and Sexual Activity     Alcohol use: Yes     Comment: I drink socially on occasion. 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    Grew up in [place]    Partner lives in [place]     Social Determinants of Health      Received from [facility]    Financial Resource Strain    Received from [employer]    Social Connections   Interpersonal Safety: Not At Risk (8/11/2021)    Humiliation, Afraid, Rape, and Kick questionnaire      Fear of Current or Ex-Partner: No      Emotionally Abused: No      Physically Abused: No      Sexually Abused: No       ROS  PHQ-2 Score:         7/11/2024    12:54 PM 7/26/2023     1:42 PM   PHQ-2 ( 1999 Pfizer)   Q1: Little interest or pleasure in doing things 0 0   Q2: Feeling down, depressed or hopeless 0 0   PHQ-2 Score 0 0   Q1: Little interest or pleasure in doing things Not at all Not at all   Q2: Feeling down, depressed or hopeless Not at all Not at all   PHQ-2 Score 0 0           5/10/2021     2:25 PM 8/11/2021     1:51 PM 6/7/2022     4:04 PM   PHQ-9 SCORE   PHQ-9 Total Score 4 4 4         8/11/2021     1:51 PM 6/7/2022    10:59 AM 7/20/2023    12:06 PM   LEXUS-7 SCORE   Total Score  5 (mild anxiety) 4 (minimal anxiety)   Total Score 4 5 4     EXAM:  Blood pressure  "118/72, pulse 60, height 1.689 m (5' 6.5\"), weight 66.2 kg (146 lb), last menstrual period 06/17/2024, not currently breastfeeding. Body mass index is 23.21 kg/m .  General - pleasant female in no acute distress.  Skin - no suspicious lesions or rashes  EENT-  euthyroid with out palpable nodules  Neck - supple without lymphadenopathy.  Lungs - clear to auscultation bilaterally.  Heart - regular rate and rhythm without murmur.  Abdomen - soft, nontender, nondistended, no masses or organomegaly noted.  Musculoskeletal - no gross deformities.  Neurological - normal strength, sensation, and mental status.    Breast Exam:  Breast: Without visible skin changes. No dimpling or lesions seen.   Breasts supple, non-tender with palpation, no dominant mass, nodularity, or nipple discharge noted bilaterally. Axillary nodes negative.      Pelvic Exam:  EG/BUS: Normal genital architecture without lesions, erythema or abnormal secretions; Bartholin's, Urethra, [place]'s normal  Urethral meatus: normal   Urethra: no masses, tenderness, or scarring  Vagina: moist, pink, rugae with creamy, white, and odorless  secretions  Cervix: no lesions, ectropion noted; pink, closed  Rectum: anus normal     ASSESSMENT:   Encounter Diagnoses   Name Primary?     Visit for preventive health examination Yes     Screening for lipid disorders      Screening for cervical cancer      Screen for STD (sexually transmitted disease)         PLAN:   Orders Placed This Encounter   Procedures     Obtaining, preparing and conveyance of cervical or vaginal smear to laboratory.     CBC with Platelets     Lipid Profile     Pap Thin Layer Screen Reflex to HPV if ASCUS - Recommended Age 25 - 29 Years     Gonorrhea PCR     Chlamydia PCR (Clinic Collect)     No orders of the defined types were placed in this encounter.    Menses   Reassured that her cycles are not pathological.   Will check hemoglobin to investigate anemia   Recommended magnesium supplement and " acupuncture as potential modalities for headache and cramping management; pt prefers to avoid pharmacologic therapy  Cholesterol   Will recheck cholesterol  Discussed fish oil supplement, incorporating low-cholesterol diet.   3. Cervical Cancer screening   Collected Pap  4. STI screening   Collected G/C swab      Follow-up as needed, reach out to transfer records to [place] provider as needed.   Pt left, stable. She expressed understanding and agreement with the plan for care.    I, Elif Cohen DNP student, completed the PFSH and ROS. I then acted as a scribe for RIGOBERTO Timmons, for the remainder of the visit.    I agree with the PFSH and ROS as completed by the RIGOBERTO Student, except for changes made by me.  The remainder of the encounter was performed by me and scribed by the RIGOBERTO Student.  The scribed note accurately reflects my personal services and decisions made by me.  Nena Varghese DNP, BEN, RIGOBERTO         Again, thank you for allowing me to participate in the care of your patient.      Sincerely,    BEN Florian CNP

## 2024-07-11 NOTE — PROGRESS NOTES
"Progress Note    SUBJECTIVE:  Valerie Rachel is an 24 year old, , who requests an Annual Preventive Exam.     Concerns today include: menstrual cycle changes after discontinuing hormonal birth control    Berta did not like the side effects of COCs or POPs and discontinued their use in 2023. She has been happy to get to know her body's cycles. She reports that her menses come monthly, last 4-5 days and are associated with dizziness and headaches. She had anemia that was treated with iron at 11 y.o. Ibuprofen and hydration help somewhat. She also has cramping and bloating for a week around the time of her ovulation. She occasionally has noted mid-cycle unilateral sharp pain in her abdomen that resolves on its own. These symptoms are not bothersome, she is seeking reassurance that this is a normal cycle and is wondering if she needs hormonal levels drawn.     She has a history of chronic bladder pain that is tolerable at this time. She has pain with voiding a few times per week. She saw pelvic floor physical therapy, but felt that her benefit plateau-ed eventually. She follows with Dr. Nance.     Sexual Hx:  No sexual concerns  1 monogamous sexual partner   Open to STI screening  - Contraception: condoms      Mental Health:   Anxiety and depression diagnosed. Well managed with therapy every other week.   Interested in medication at some point, but not interested today d/t her upcoming move     Social:  Moving to [facility] in August with her boyfriend. 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      Diet-   Trying to incorporate more fruits and veggies   \"Snack plate\" at lunch, fruit, yogurt, cheese, hummus, carrots   Dinner: cooking at home, vegetable     Exercise:   Goes on walks on treadmill or outside, weight lifting   3-4 times a week at least      Immunizations- due for TDAP per records. Stated she moved recently and will look back in her records to see her last " immunization.     Preventive labs- Elevated lipid levels (2022 and 2023)- No family Hx of cardiovascular events or hyperlipidemia.     Menstrual History:      3/7/2023     1:30 PM 2023     1:00 PM 2024     1:00 PM   Menstrual History   LAST MENSTRUAL PERIOD 2023       Last    Lab Results   Component Value Date    PAP NIL 2021     History of abnormal Pap smear: No - age 21-29 PAP every 3 years recommended ---> DUE    Mammogram current: not applicable    Last Colonoscopy:/a    HISTORY:  Current Outpatient Medications   Medication Sig Dispense Refill    cholecalciferol 50 MCG ( UT) tablet Take 2,000 Units by mouth       No current facility-administered medications for this visit.     Allergies   Allergen Reactions    Penicillins Hives and Rash     Immunization History   Administered Date(s) Administered    COVID-19 MONOVALENT 12+ (Pfizer) 2021, 2021, 2021    DTAP (<7y) 1998, 1998, 1998, 1999, 2003    HIB, Unspecified 1998, 1998, 1999    HPV Quadrivalent 2013, 2013, 2013    HepB, Unspecified 1998, 1998, 1999    Influenza Vaccine >6 months,quad, PF 2014    Influenza, Whole Virus 2018    MMR 1999, 2003    Meningococcal (Menomune ) 2010    Meningococcal ACWY (Menactra ) 07/15/2015    Poliovirus, inactivated (IPV) 1998, 1998, 1999, 2003    TDAP (Adacel,Boostrix) 2009, 06/15/2020    Varicella 10/22/1999, 2007       OB History    Para Term  AB Living   0 0 0 0 0 0   SAB IAB Ectopic Multiple Live Births   0 0 0 0 0     Past Medical History:   Diagnosis Date    Anxiety     Patellar tendonitis     Recurrent yeast vulvovaginitis      Past Surgical History:   Procedure Laterality Date    NO HISTORY OF SURGERY      WISDOM TOOTH EXTRACTION Bilateral      History reviewed. No pertinent family  "history.  Social History     Socioeconomic History    Marital status: Single   Tobacco Use    Smoking status: Never    Smokeless tobacco: Never   Substance and Sexual Activity    Alcohol use: Yes     Comment: I drink socially on occasion. 1/w    Drug use: Never    Sexual activity: Yes     Partners: Male     Birth control/protection: Pull-out method, Condom, Pill   Social History Narrative    Work:  for [employer]    ANGELA [employer] grad: Advertising    Grew up in [place]    Partner lives in [place]     Social Determinants of Health      Received from [facility]    Financial Resource Strain    Received from [facility]    Social Connections   Interpersonal Safety: Not At Risk (8/11/2021)    Humiliation, Afraid, Rape, and Kick questionnaire     Fear of Current or Ex-Partner: No     Emotionally Abused: No     Physically Abused: No     Sexually Abused: No       ROS  PHQ-2 Score:         7/11/2024    12:54 PM 7/26/2023     1:42 PM   PHQ-2 ( 1999 Pfizer)   Q1: Little interest or pleasure in doing things 0 0   Q2: Feeling down, depressed or hopeless 0 0   PHQ-2 Score 0 0   Q1: Little interest or pleasure in doing things Not at all Not at all   Q2: Feeling down, depressed or hopeless Not at all Not at all   PHQ-2 Score 0 0           5/10/2021     2:25 PM 8/11/2021     1:51 PM 6/7/2022     4:04 PM   PHQ-9 SCORE   PHQ-9 Total Score 4 4 4         8/11/2021     1:51 PM 6/7/2022    10:59 AM 7/20/2023    12:06 PM   LEXUS-7 SCORE   Total Score  5 (mild anxiety) 4 (minimal anxiety)   Total Score 4 5 4     EXAM:  Blood pressure 118/72, pulse 60, height 1.689 m (5' 6.5\"), weight 66.2 kg (146 lb), last menstrual period 06/17/2024, not currently breastfeeding. Body mass index is 23.21 kg/m .  General - pleasant female in no acute distress.  Skin - no suspicious lesions or rashes  EENT-  euthyroid with out palpable nodules  Neck - supple without " lymphadenopathy.  Lungs - clear to auscultation bilaterally.  Heart - regular rate and rhythm without murmur.  Abdomen - soft, nontender, nondistended, no masses or organomegaly noted.  Musculoskeletal - no gross deformities.  Neurological - normal strength, sensation, and mental status.    Breast Exam:  Breast: Without visible skin changes. No dimpling or lesions seen.   Breasts supple, non-tender with palpation, no dominant mass, nodularity, or nipple discharge noted bilaterally. Axillary nodes negative.      Pelvic Exam:  EG/BUS: Normal genital architecture without lesions, erythema or abnormal secretions; Bartholin's, Urethra, [place]'s normal  Urethral meatus: normal   Urethra: no masses, tenderness, or scarring  Vagina: moist, pink, rugae with creamy, white, and odorless  secretions  Cervix: no lesions, ectropion noted; pink, closed  Rectum: anus normal     ASSESSMENT:   Encounter Diagnoses   Name Primary?    Visit for preventive health examination Yes    Screening for lipid disorders     Screening for cervical cancer     Screen for STD (sexually transmitted disease)         PLAN:   Orders Placed This Encounter   Procedures    Obtaining, preparing and conveyance of cervical or vaginal smear to laboratory.    CBC with Platelets    Lipid Profile    Pap Thin Layer Screen Reflex to HPV if ASCUS - Recommended Age 25 - 29 Years    Gonorrhea PCR    Chlamydia PCR (Clinic Collect)     No orders of the defined types were placed in this encounter.    Menses   Reassured that her cycles are not pathological.   Will check hemoglobin to investigate anemia   Recommended magnesium supplement and acupuncture as potential modalities for headache and cramping management; pt prefers to avoid pharmacologic therapy  Cholesterol   Will recheck cholesterol  Discussed fish oil supplement, incorporating low-cholesterol diet.   3. Cervical Cancer screening   Collected Pap  4. STI screening   Collected G/C swab      Follow-up as needed,  reach out to transfer records to [place] provider as needed.   Pt left, stable. She expressed understanding and agreement with the plan for care.    I, Elif Cohen DNP student, completed the PFSH and ROS. I then acted as a scribe for RIGOBERTO Timmons, for the remainder of the visit.    I agree with the PFSH and ROS as completed by the RIGOBERTO Student, except for changes made by me.  The remainder of the encounter was performed by me and scribed by the RIGOBERTO Student.  The scribed note accurately reflects my personal services and decisions made by me.  Nena Varghese, ASHOK, APRN, WHNP

## 2024-07-11 NOTE — PATIENT INSTRUCTIONS
Thank you for trusting us with your care!     If you need to contact us for questions about:  Symptoms, Scheduling & Medical Questions; Non-urgent (2-3 day response) Carmelo message, Urgent (needing response today) [phone] (if after 3:30pm next day response)   Prescriptions: Please call your Pharmacy   Billing: [facility] [phone] or [facility]:[phone]

## 2024-07-12 LAB
C TRACH DNA SPEC QL NAA+PROBE: NEGATIVE
N GONORRHOEA DNA SPEC QL NAA+PROBE: NEGATIVE

## 2024-07-16 LAB
BKR LAB AP GYN ADEQUACY: NORMAL
BKR LAB AP GYN INTERPRETATION: NORMAL
BKR LAB AP HPV REFLEX: NORMAL
BKR LAB AP LMP: NORMAL
BKR LAB AP PREVIOUS ABNORMAL: NORMAL
PATH REPORT.COMMENTS IMP SPEC: NORMAL
PATH REPORT.COMMENTS IMP SPEC: NORMAL
PATH REPORT.RELEVANT HX SPEC: NORMAL

## 2025-08-30 ENCOUNTER — HEALTH MAINTENANCE LETTER (OUTPATIENT)
Age: 25
End: 2025-08-30

## (undated) RX ORDER — LIDOCAINE HYDROCHLORIDE 20 MG/ML
JELLY TOPICAL
Status: DISPENSED
Start: 2023-04-28